# Patient Record
Sex: MALE | Race: WHITE | NOT HISPANIC OR LATINO
[De-identification: names, ages, dates, MRNs, and addresses within clinical notes are randomized per-mention and may not be internally consistent; named-entity substitution may affect disease eponyms.]

---

## 2018-12-03 ENCOUNTER — RX RENEWAL (OUTPATIENT)
Age: 68
End: 2018-12-03

## 2018-12-31 ENCOUNTER — RX RENEWAL (OUTPATIENT)
Age: 68
End: 2018-12-31

## 2019-01-25 ENCOUNTER — RX RENEWAL (OUTPATIENT)
Age: 69
End: 2019-01-25

## 2019-02-20 ENCOUNTER — RX RENEWAL (OUTPATIENT)
Age: 69
End: 2019-02-20

## 2019-03-18 ENCOUNTER — RX RENEWAL (OUTPATIENT)
Age: 69
End: 2019-03-18

## 2019-07-02 ENCOUNTER — RX RENEWAL (OUTPATIENT)
Age: 69
End: 2019-07-02

## 2019-08-04 ENCOUNTER — RX RENEWAL (OUTPATIENT)
Age: 69
End: 2019-08-04

## 2019-09-03 ENCOUNTER — RX RENEWAL (OUTPATIENT)
Age: 69
End: 2019-09-03

## 2019-09-12 ENCOUNTER — RX RENEWAL (OUTPATIENT)
Age: 69
End: 2019-09-12

## 2019-12-10 ENCOUNTER — RX RENEWAL (OUTPATIENT)
Age: 69
End: 2019-12-10

## 2020-03-15 ENCOUNTER — RX RENEWAL (OUTPATIENT)
Age: 70
End: 2020-03-15

## 2020-04-10 ENCOUNTER — RX RENEWAL (OUTPATIENT)
Age: 70
End: 2020-04-10

## 2020-08-19 ENCOUNTER — RESULT REVIEW (OUTPATIENT)
Age: 70
End: 2020-08-19

## 2020-08-19 ENCOUNTER — APPOINTMENT (OUTPATIENT)
Dept: GERIATRICS | Facility: CLINIC | Age: 70
End: 2020-08-19
Payer: MEDICARE

## 2020-08-19 VITALS
HEIGHT: 69 IN | WEIGHT: 159.13 LBS | HEART RATE: 64 BPM | SYSTOLIC BLOOD PRESSURE: 122 MMHG | BODY MASS INDEX: 23.57 KG/M2 | DIASTOLIC BLOOD PRESSURE: 78 MMHG | TEMPERATURE: 97.5 F | OXYGEN SATURATION: 98 % | RESPIRATION RATE: 16 BRPM

## 2020-08-19 DIAGNOSIS — Z81.8 FAMILY HISTORY OF OTHER MENTAL AND BEHAVIORAL DISORDERS: ICD-10-CM

## 2020-08-19 DIAGNOSIS — N39.0 URINARY TRACT INFECTION, SITE NOT SPECIFIED: ICD-10-CM

## 2020-08-19 DIAGNOSIS — Z82.49 FAMILY HISTORY OF ISCHEMIC HEART DISEASE AND OTHER DISEASES OF THE CIRCULATORY SYSTEM: ICD-10-CM

## 2020-08-19 DIAGNOSIS — J30.2 OTHER SEASONAL ALLERGIC RHINITIS: ICD-10-CM

## 2020-08-19 DIAGNOSIS — Z87.19 PERSONAL HISTORY OF OTHER DISEASES OF THE DIGESTIVE SYSTEM: ICD-10-CM

## 2020-08-19 DIAGNOSIS — F10.21 ALCOHOL DEPENDENCE, IN REMISSION: ICD-10-CM

## 2020-08-19 DIAGNOSIS — Z87.438 PERSONAL HISTORY OF OTHER DISEASES OF MALE GENITAL ORGANS: ICD-10-CM

## 2020-08-19 PROCEDURE — 99205 OFFICE O/P NEW HI 60 MIN: CPT

## 2020-08-19 RX ORDER — FERROUS SULFATE 325(65) MG
325 (65 FE) TABLET ORAL DAILY
Refills: 0 | Status: ACTIVE | COMMUNITY

## 2020-08-19 RX ORDER — ALBUTEROL SULFATE 90 UG/1
108 (90 BASE) AEROSOL, METERED RESPIRATORY (INHALATION)
Refills: 0 | Status: ACTIVE | COMMUNITY

## 2020-08-19 NOTE — HISTORY OF PRESENT ILLNESS
[Over the Past 2 Weeks, Have You Felt Down, Depressed, or Hopeless?] : 1.) Over the past 2 weeks, have you felt down, depressed, or hopeless? No [Retired] : retired from work [Spouse] : spouse [Over the Past 2 Weeks, Have You Felt Little Interest or Pleasure Doing Things?] : 2.) Over the past 2 weeks, have you felt little interest or pleasure doing things? No [Patient Concern] : no personal concern about alcohol use [Tolerance to Alcohol] : no tolerance to alcohol [Family Concern] : no family concern about alcohol use [Attempts to Cut Down] : no attempts to cut alcohol use [Annoyed by Criticism] : no annoyance by criticism of alcohol use [Never] : has never used illicit drugs [None] : The patient has no concerns about alcohol abuse [Morning Drinking] : no morning drinking [Adequate] : inadequate [Drives without concerns] : drives without concerns [Fully Independent] : fully independent [No history of falls] : no history of falls [Bicycle Helmet] : not using bicycle helmet [Seatbelts] : seatbelts [Motorcycle Helmet] : not using motorcycle helmet [Safe Driving Habits] : safe driving habits [Smoke Detectors] : smoke detectors [Carbon Monoxide Detector] : carbon monoxide detector [Fall Prevention Measures] : fall prevention measures [Hot Water Temp <120F] : hot water temp <120F [Bathroom Grab Bars] : not using bathroom grab bars [Sunscreen] : sunscreen [de-identified] : 0 [FreeTextEntry2] : No exposure [de-identified] : 0 [de-identified] : P/t does not smoke [de-identified] : P/t does not consume alcohol [de-identified] : P/t does not consume alcohol [de-identified] : None [de-identified] : P/t does not take illicit drugs [de-identified] : P/t does not take illicit drugs [0] : 1) Little interest or pleasure doing things: Not at all [PHQ-2 Score ___] : PHQ-2 Score [unfilled] [FreeTextEntry1] : controlled on wellbutryn

## 2020-08-19 NOTE — SOCIAL HISTORY
[No falls in past year] : Patient reported no falls in the past year [Fully functional (bathing, dressing, toileting, transferring, walking, feeding)] : Fully functional (bathing, dressing, toileting, transferring, walking, feeding) [Smoke Detector] : smoke detector [Fully functional (using the telephone, shopping, preparing meals, housekeeping, doing laundry, using transportation,] : Fully functional and needs no help or supervision to perform IADLs (using the telephone, shopping, preparing meals, housekeeping, doing laundry, using transportation, managing medications and managing finances) [Seat Belt] :  uses seat belt

## 2020-08-19 NOTE — REASON FOR VISIT
[Initial Annual Medicare Wellness Visit] : an initial annual Medicare wellness visit [Other: _____] : [unfilled] [FreeTextEntry1] : Annual/Physical [Initial Evaluation] : an initial evaluation

## 2020-08-19 NOTE — REVIEW OF SYSTEMS
[Feeling Poorly] : feeling poorly [Feeling Tired] : feeling tired [Dysuria] : dysuria [Incontinence] : incontinence [Hesitancy] : urinary hesitancy [Fever] : no fever [Eye Pain] : no eye pain [Chills] : no chills [Nosebleeds] : no nosebleeds [Red Eyes] : eyes not red [Chest Pain] : no chest pain [Nasal Discharge] : no nasal discharge [Palpitations] : no palpitations [Cough] : no cough [Constipation] : no constipation [SOB on Exertion] : no shortness of breath during exertion [Diarrhea] : no diarrhea [Joint Pain] : no joint pain [Arthralgias] : no arthralgias [Dizziness] : no dizziness [Skin Lesions] : no skin lesions [Anxiety] : no anxiety [de-identified] : controlled depression [Fainting] : no fainting

## 2020-08-19 NOTE — ASSESSMENT
[FreeTextEntry1] : rule out UTI vs pyelo vs prostatitis which he has had in past (early 20's)\par of note symptoms have returned s/p completion of levaquin\par will refer to [facility] to establish care\par check UA Ux to assure no current infection and also renal US\par if all negative could consider longer treatment for suspected prostatitis\par signed release to get records from PCP and urologist\par urology - issues with retention, issues passing catheter ?stricture?\par to get records\par physical, labs, MOLST in spring 2021\par also to discuss pneumonia shot\par will defer today as he may require antibiotics in 24 hours\par flu shot advised in fall\par has seen Dr. Sanchez in past for IBS - on bentyl and apparnetly due to repeat colonoscopy will print note from [facility]\par also very open\par admitted he has had issues with ETOH dependence and fiorcet/valium in past - clean for 5 years!

## 2020-08-19 NOTE — PHYSICAL EXAM
[General Appearance - Alert] : alert [General Appearance - In No Acute Distress] : in no acute distress [Sclera] : the sclera and conjunctiva were normal [General Appearance - Well Nourished] : well nourished [General Appearance - Well Developed] : well developed [PERRL With Normal Accommodation] : pupils were equal in size, round, and reactive to light [Extraocular Movements] : extraocular movements were intact [Normal Oral Mucosa] : normal oral mucosa [No Oral Pallor] : no oral pallor [Outer Ear] : the ears and nose were normal in appearance [Hearing Threshold Finger Rub Not [place]e] : hearing was normal [Neck Appearance] : the appearance of the neck was normal [Neck Cervical Mass (___cm)] : no neck mass was observed [Exaggerated Use Of Accessory Muscles For Inspiration] : no accessory muscle use [Respiration, Rhythm And Depth] : normal respiratory rhythm and effort [Auscultation Breath Sounds / Voice Sounds] : lungs were clear to auscultation bilaterally [Apical Impulse] : the apical impulse was normal [Heart Sounds] : normal S1 and S2 [Heart Sounds Gallop] : no gallops [Heart Sounds Pericardial Friction Rub] : no pericardial rub [Bowel Sounds] : normal bowel sounds [Abdomen Soft] : soft [Abdomen Tenderness] : non-tender [Cervical Lymph Nodes Enlarged Posterior Bilaterally] : posterior cervical [Cervical Lymph Nodes Enlarged Anterior Bilaterally] : anterior cervical [No CVA Tenderness] : no ~M costovertebral angle tenderness [Abnormal Walk] : normal gait [Nail Clubbing] : no clubbing  or cyanosis of the fingernails [Musculoskeletal - Swelling] : no joint swelling seen [Motor Tone] : muscle strength and tone were normal [Skin Color & Pigmentation] : normal skin color and pigmentation [] : no rash [Oriented To Time, Place, And Person] : oriented to person, place, and time [Skin Turgor] : normal skin turgor [Impaired Insight] : insight and judgment were intact [Penis Abnormality] : the penis was normal [Testes Swelling] : the testicles were not swollen [Scrotum] : the scrotum was normal [Testes Mass (___cm)] : there were no testicular masses

## 2020-08-19 NOTE — PHYSICAL EXAM
[General Appearance - Alert] : alert [General Appearance - In No Acute Distress] : in no acute distress [Sclera] : the sclera and conjunctiva were normal [General Appearance - Well Developed] : well developed [General Appearance - Well Nourished] : well nourished [Extraocular Movements] : extraocular movements were intact [Normal Oral Mucosa] : normal oral mucosa [PERRL With Normal Accommodation] : pupils were equal in size, round, and reactive to light [Outer Ear] : the ears and nose were normal in appearance [No Oral Pallor] : no oral pallor [Neck Appearance] : the appearance of the neck was normal [Hearing Threshold Finger Rub Not Jewell] : hearing was normal [Neck Cervical Mass (___cm)] : no neck mass was observed [Exaggerated Use Of Accessory Muscles For Inspiration] : no accessory muscle use [Respiration, Rhythm And Depth] : normal respiratory rhythm and effort [Apical Impulse] : the apical impulse was normal [Auscultation Breath Sounds / Voice Sounds] : lungs were clear to auscultation bilaterally [Heart Sounds] : normal S1 and S2 [Heart Sounds Gallop] : no gallops [Heart Sounds Pericardial Friction Rub] : no pericardial rub [Bowel Sounds] : normal bowel sounds [Abdomen Soft] : soft [Abdomen Tenderness] : non-tender [Cervical Lymph Nodes Enlarged Posterior Bilaterally] : posterior cervical [Cervical Lymph Nodes Enlarged Anterior Bilaterally] : anterior cervical [No CVA Tenderness] : no ~M costovertebral angle tenderness [Abnormal Walk] : normal gait [Nail Clubbing] : no clubbing  or cyanosis of the fingernails [Musculoskeletal - Swelling] : no joint swelling seen [Motor Tone] : muscle strength and tone were normal [Skin Color & Pigmentation] : normal skin color and pigmentation [] : no rash [Oriented To Time, Place, And Person] : oriented to person, place, and time [Skin Turgor] : normal skin turgor [Impaired Insight] : insight and judgment were intact [Penis Abnormality] : the penis was normal [Scrotum] : the scrotum was normal [Testes Swelling] : the testicles were not swollen [Testes Mass (___cm)] : there were no testicular masses

## 2020-08-19 NOTE — REVIEW OF SYSTEMS
[Feeling Poorly] : feeling poorly [Feeling Tired] : feeling tired [Dysuria] : dysuria [Incontinence] : incontinence [Hesitancy] : urinary hesitancy [Fever] : no fever [Eye Pain] : no eye pain [Chills] : no chills [Nosebleeds] : no nosebleeds [Red Eyes] : eyes not red [Nasal Discharge] : no nasal discharge [Chest Pain] : no chest pain [Cough] : no cough [Palpitations] : no palpitations [SOB on Exertion] : no shortness of breath during exertion [Constipation] : no constipation [Arthralgias] : no arthralgias [Joint Pain] : no joint pain [Diarrhea] : no diarrhea [Dizziness] : no dizziness [Skin Lesions] : no skin lesions [de-identified] : controlled depression [Fainting] : no fainting [Anxiety] : no anxiety

## 2020-08-19 NOTE — HISTORY OF PRESENT ILLNESS
[Over the Past 2 Weeks, Have You Felt Down, Depressed, or Hopeless?] : 1.) Over the past 2 weeks, have you felt down, depressed, or hopeless? No [Over the Past 2 Weeks, Have You Felt Little Interest or Pleasure Doing Things?] : 2.) Over the past 2 weeks, have you felt little interest or pleasure doing things? No [Retired] : retired from work [Spouse] : spouse [Tolerance to Alcohol] : no tolerance to alcohol [Patient Concern] : no personal concern about alcohol use [Family Concern] : no family concern about alcohol use [Attempts to Cut Down] : no attempts to cut alcohol use [Annoyed by Criticism] : no annoyance by criticism of alcohol use [Morning Drinking] : no morning drinking [Never] : has never used illicit drugs [None] : The patient has no concerns about alcohol abuse [Fully Independent] : fully independent [Adequate] : inadequate [Drives without concerns] : drives without concerns [No history of falls] : no history of falls [Seatbelts] : seatbelts [Bicycle Helmet] : not using bicycle helmet [Motorcycle Helmet] : not using motorcycle helmet [Safe Driving Habits] : safe driving habits [Smoke Detectors] : smoke detectors [Carbon Monoxide Detector] : carbon monoxide detector [Bathroom Grab Bars] : not using bathroom grab bars [Hot Water Temp <120F] : hot water temp <120F [Fall Prevention Measures] : fall prevention measures [de-identified] : 0 [Sunscreen] : sunscreen [de-identified] : P/t does not smoke [de-identified] : 0 [FreeTextEntry2] : No exposure [de-identified] : P/t does not consume alcohol [de-identified] : P/t does not consume alcohol [de-identified] : P/t does not take illicit drugs [de-identified] : None [de-identified] : P/t does not take illicit drugs [0] : 2) Feeling down, depressed, or hopeless: Not at all [PHQ-2 Score ___] : PHQ-2 Score [unfilled] [FreeTextEntry1] : controlled on wellbutryn

## 2020-08-19 NOTE — ASSESSMENT
[FreeTextEntry1] : rule out UTI vs pyelo vs prostatitis which he has had in past (early 20's)\par of note symptoms have returned s/p completion of levaquin\par will refer to [facility] to establish care\par check UA Ux to assure no current infection and also renal US\par if all negative could consider longer treatment for suspected prostatitis\par signed release to get records from PCP and urologist\par urology - issues with retention, issues passing catheter ?stricture?\par to get records\par physical, labs, MOLST in spring 2021\par also to discuss pneumonia shot\par will defer today as he may require antibiotics in 24 hours\par flu shot advised in fall\par has seen Dr. Sanchez in past for IBS - on bentyl and apparnetly due to repeat colonoscopy will print note from [facility]\par also very open\par admitted he has had issues with ETOH dependence and fiorcet/valium in past - clean for 5 years!

## 2020-08-21 ENCOUNTER — RESULT REVIEW (OUTPATIENT)
Age: 70
End: 2020-08-21

## 2020-09-22 DIAGNOSIS — Z23 ENCOUNTER FOR IMMUNIZATION: ICD-10-CM

## 2020-10-09 ENCOUNTER — APPOINTMENT (OUTPATIENT)
Dept: GERIATRICS | Facility: CLINIC | Age: 70
End: 2020-10-09
Payer: MEDICARE

## 2020-10-09 PROCEDURE — ZZZZZ: CPT

## 2020-11-09 ENCOUNTER — APPOINTMENT (OUTPATIENT)
Dept: GERIATRICS | Facility: CLINIC | Age: 70
End: 2020-11-09
Payer: MEDICARE

## 2020-11-09 VITALS
BODY MASS INDEX: 23.42 KG/M2 | SYSTOLIC BLOOD PRESSURE: 120 MMHG | HEART RATE: 64 BPM | OXYGEN SATURATION: 94 % | WEIGHT: 158.6 LBS | TEMPERATURE: 97.8 F | DIASTOLIC BLOOD PRESSURE: 80 MMHG

## 2020-11-09 DIAGNOSIS — Z87.09 PERSONAL HISTORY OF OTHER DISEASES OF THE RESPIRATORY SYSTEM: ICD-10-CM

## 2020-11-09 PROCEDURE — 99214 OFFICE O/P EST MOD 30 MIN: CPT

## 2020-11-09 RX ORDER — LEVOFLOXACIN 500 MG/1
500 TABLET, FILM COATED ORAL DAILY
Qty: 14 | Refills: 0 | Status: COMPLETED | COMMUNITY
Start: 2020-08-21 | End: 2020-11-09

## 2020-11-09 RX ORDER — BACLOFEN 10 MG/1
10 TABLET ORAL
Qty: 60 | Refills: 0 | Status: COMPLETED | COMMUNITY
Start: 2020-09-29

## 2020-11-09 RX ORDER — SULFAMETHOXAZOLE AND TRIMETHOPRIM 400; 80 MG/1; MG/1
400-80 TABLET ORAL
Qty: 16 | Refills: 0 | Status: COMPLETED | COMMUNITY
Start: 2020-08-06

## 2020-11-13 NOTE — REVIEW OF SYSTEMS
[Anxiety] : anxiety [Depression] : depression [Negative] : Heme/Lymph [Suicidal] : not suicidal [FreeTextEntry8] : BPH

## 2020-11-13 NOTE — PHYSICAL EXAM
[No Acute Distress] : no acute distress [Well-Appearing] : well-appearing [Normal Sclera/Conjunctiva] : normal sclera/conjunctiva [PERRL] : pupils equal round and reactive to light [EOMI] : extraocular movements intact [Normal Outer Ear/Nose] : the outer ears and nose were normal in appearance [Normal TMs] : both tympanic membranes were normal [No JVD] : no jugular venous distention [No Lymphadenopathy] : no lymphadenopathy [Supple] : supple [Thyroid Normal, No Nodules] : the thyroid was normal and there were no nodules present [No Respiratory Distress] : no respiratory distress  [No Accessory Muscle Use] : no accessory muscle use [Clear to Auscultation] : lungs were clear to auscultation bilaterally [Normal Rate] : normal rate  [Regular Rhythm] : with a regular rhythm [Normal S1, S2] : normal S1 and S2 [No Murmur] : no murmur heard [No Carotid Bruits] : no carotid bruits [No Abdominal Bruit] : a ~M bruit was not heard ~T in the abdomen [Pedal Pulses Present] : the pedal pulses are present [No Edema] : there was no peripheral edema [Soft] : abdomen soft [Non Tender] : non-tender [Non-distended] : non-distended [Normal Bowel Sounds] : normal bowel sounds [Normal Posterior Cervical Nodes] : no posterior cervical lymphadenopathy [Normal Anterior Cervical Nodes] : no anterior cervical lymphadenopathy [No CVA Tenderness] : no CVA  tenderness [No Spinal Tenderness] : no spinal tenderness [No Joint Swelling] : no joint swelling [Grossly Normal Strength/Tone] : grossly normal strength/tone [No Rash] : no rash [Coordination Grossly Intact] : coordination grossly intact [No Focal Deficits] : no focal deficits [Normal Gait] : normal gait [Normal Affect] : the affect was normal [Alert and Oriented x3] : oriented to person, place, and time [Normal Insight/Judgement] : insight and judgment were intact [de-identified] : Erythematous oropharynx

## 2020-11-13 NOTE — RESULTS/DATA
[] : results reviewed [de-identified] : Hgb 13.9 / Hct 41.3 [de-identified] : APTT 24.7 / PT 10.3 / INR 1.0 [de-identified] : Cr 0.95 ; glucose 86 [de-identified] : 1/13/20 Sinus anjali

## 2020-11-13 NOTE — HISTORY OF PRESENT ILLNESS
[Asthma] : asthma [(Patient denies any chest pain, claudication, dyspnea on exertion, orthopnea, palpitations or syncope)] : Patient denies any chest pain, claudication, dyspnea on exertion, orthopnea, palpitations or syncope [Good (7-10 METs)] : Good (7-10 METs) [Aortic Stenosis] : no aortic stenosis [Atrial Fibrillation] : no atrial fibrillation [Coronary Artery Disease] : no coronary artery disease [Recent Myocardial Infarction] : no recent myocardial infarction [Implantable Device/Pacemaker] : no implantable device/pacemaker [COPD] : no COPD [Sleep Apnea] : no sleep apnea [Smoker] : not a smoker [Family Member] : no family member with adverse anesthesia reaction/sudden death [Self] : no previous adverse anesthesia reaction [Chronic Anticoagulation] : no chronic anticoagulation [Chronic Kidney Disease] : no chronic kidney disease [Diabetes] : no diabetes [FreeTextEntry1] : TURP / TRUS [FreeTextEntry2] : 11/16/2020 [FreeTextEntry3] : Dr. Charles [FreeTextEntry4] : 69 year old male with a history of depression, HLD, GERD, IBS, asthma, BPH who presents today for a preoperative examination.   \par \par BPH: Patient had 2 TURPs, last one earlier this year.  Post op complication with dysuria, urinary retention, requiring catheterization and UTI.   On 11/3 increased issues with urination, contacted Dr. Reno's office and was seen.  A catheter was placed. Patient was referred to [facility].  He is now scheduled for a TURP / TRUS for contracture of bladder neck. Currently on Keflex.  Scheduled for preop at [facility] on 11/11/20.  \par \par Asthma: Last exacerbation about 3-4 weeks ago, used albuterol.  Never hospitalized for asthma.  Currently on Flovent.  Denies any chest tightness, wheezing, cough.  Walks daily, was also going on treadmill but has not been able to since catheterization.   [FreeTextEntry7] : EKG 1/13/2020 Sinus anjali

## 2020-11-13 NOTE — ASSESSMENT
[High Risk Surgery - Intraperitoneal, Intrathoracic or Supringuinal Vascular Procedures] : High Risk Surgery - Intraperitoneal, Intrathoracic or Supringuinal Vascular Procedures - No (0) [Ischemic Heart Disease] : Ischemic Heart Disease - No (0) [Congestive Heart Failure] : Congestive Heart Failure - No (0) [Prior Cerebrovascular Accident or TIA] : Prior Cerebrovascular Accident or TIA - No (0) [Creatinine >= 2mg/dL (1 Point)] : Creatinine >= 2mg/dL - No (0) [Insulin-dependent Diabetic (1 Point)] : Insulin-dependent Diabetic - No (0) [0] : 0 , RCRI Class: I, Risk of Post-Op Cardiac Complications: 3.9%, 95% CI for Risk Estimate: 2.8% - 5.4% [Continue medications as is] : Continue current medications [As per surgery] : as per surgery [Patient Optimized for Surgery] : Patient optimized for surgery [No Further Testing Recommended] : no further testing recommended [FreeTextEntry4] : Patient with no active cardiac or pulmonary symptoms.  He does have a history of asthma but it is well controlled on Flovent.  EKG from earlier this year was reviewed, sinus anjali noted.  Labs recently done at [facility] reviewed and stable.  Patient may proceed with the scheduled procedure with acceptable cardiac risk and without further cardiac work up or intervention.\par

## 2020-12-14 ENCOUNTER — APPOINTMENT (OUTPATIENT)
Dept: GASTROENTEROLOGY | Facility: CLINIC | Age: 70
End: 2020-12-14
Payer: MEDICARE

## 2020-12-14 VITALS
OXYGEN SATURATION: 97 % | HEART RATE: 69 BPM | HEIGHT: 69 IN | TEMPERATURE: 97.3 F | DIASTOLIC BLOOD PRESSURE: 80 MMHG | WEIGHT: 158 LBS | SYSTOLIC BLOOD PRESSURE: 110 MMHG | BODY MASS INDEX: 23.4 KG/M2

## 2020-12-14 DIAGNOSIS — Z12.11 ENCOUNTER FOR SCREENING FOR MALIGNANT NEOPLASM OF COLON: ICD-10-CM

## 2020-12-14 PROCEDURE — 99214 OFFICE O/P EST MOD 30 MIN: CPT

## 2020-12-14 RX ORDER — CEPHALEXIN 500 MG/1
500 CAPSULE ORAL
Refills: 0 | Status: DISCONTINUED | COMMUNITY
Start: 2020-11-09 | End: 2020-12-14

## 2020-12-14 RX ORDER — OMEPRAZOLE 20 MG/1
20 TABLET, DELAYED RELEASE ORAL DAILY
Refills: 0 | Status: DISCONTINUED | COMMUNITY
End: 2020-12-14

## 2020-12-14 NOTE — ASSESSMENT
[FreeTextEntry1] : Will plan on a colonoscopy for surveillance h/o polyps.  Explained risks/benefits/alternatives including not limited to bleeding, infection, perforation, missed lesions, anesthesia complications.  Patient understands and agrees, all questions answered.  Will use a split dose miralax/gatorade prep with clears the day prior.\par

## 2020-12-14 NOTE — HISTORY OF PRESENT ILLNESS
[FreeTextEntry1] : Mr. Herrera is a pleasant 70M h/o BPH, asthma, HLD, depression who returns for f/u.\par \par He was last seen 6/17 at which time he had an EGD/colonoscopy for GERD/screening:\par Impression:\par 1. Gastritis\par 2. Candidal esophagitis\par 3. 14 mm proximal transverse colon polyp\par 4. Internal hemorrhoids\par \par Unremarkable EGD pathology.\par \par His transverse colon polyp pathology came back as a tubular adenoma.  He was recommended for a 3 year interval colonoscopy.\par \par Feeling well.\par \par No complaints.  Normal brown BM daily.\par \par No heartburn or regurgitation currently.\par \par Does not smoke or drink currently.\par \par No family h/o any GI malignancies.

## 2020-12-14 NOTE — PHYSICAL EXAM
[General Appearance - Alert] : alert [General Appearance - In No Acute Distress] : in no acute distress [Sclera] : the sclera and conjunctiva were normal [Outer Ear] : the ears and nose were normal in appearance [Neck Appearance] : the appearance of the neck was normal [] : no respiratory distress [Abdomen Soft] : soft [Abdomen Tenderness] : non-tender [Abnormal Walk] : normal gait [Skin Color & Pigmentation] : normal skin color and pigmentation [Cranial Nerves] : cranial nerves 2-12 were intact [Oriented To Time, Place, And Person] : oriented to person, place, and time

## 2020-12-23 PROBLEM — N39.0 ACUTE LOWER UTI: Status: RESOLVED | Noted: 2020-08-19 | Resolved: 2020-12-23

## 2021-01-16 ENCOUNTER — RESULT REVIEW (OUTPATIENT)
Age: 71
End: 2021-01-16

## 2021-01-19 ENCOUNTER — APPOINTMENT (OUTPATIENT)
Dept: GASTROENTEROLOGY | Facility: HOSPITAL | Age: 71
End: 2021-01-19

## 2021-02-18 ENCOUNTER — RX RENEWAL (OUTPATIENT)
Age: 71
End: 2021-02-18

## 2021-04-13 ENCOUNTER — APPOINTMENT (OUTPATIENT)
Dept: GERIATRICS | Facility: CLINIC | Age: 71
End: 2021-04-13
Payer: MEDICARE

## 2021-04-13 VITALS
OXYGEN SATURATION: 97 % | SYSTOLIC BLOOD PRESSURE: 140 MMHG | DIASTOLIC BLOOD PRESSURE: 90 MMHG | WEIGHT: 163 LBS | TEMPERATURE: 97.9 F | HEART RATE: 77 BPM | BODY MASS INDEX: 24.07 KG/M2

## 2021-04-13 PROCEDURE — G0439: CPT

## 2021-04-13 NOTE — ASSESSMENT
[FreeTextEntry1] : will check fasting labs including iron studies, lipids and PSA\par he will have prostate exam with urologist\par will get urology progress note, US and cystoscopy results faxed to our office\par refer to [facility] for allergic rhinitis/asthma workup\par plan is to RTC 3 months for MOLST\par in future consider MOCA for baseline\par and Tdap and Shingrix\par \par

## 2021-04-13 NOTE — REVIEW OF SYSTEMS
[Fever] : no fever [Chills] : no chills [Feeling Poorly] : feeling poorly [Feeling Tired] : feeling tired [Eye Pain] : no eye pain [Red Eyes] : eyes not red [Nosebleeds] : no nosebleeds [Nasal Discharge] : no nasal discharge [Chest Pain] : no chest pain [Palpitations] : no palpitations [Cough] : no cough [SOB on Exertion] : no shortness of breath during exertion [Constipation] : no constipation [Diarrhea] : no diarrhea [Dysuria] : dysuria [Incontinence] : incontinence [Hesitancy] : urinary hesitancy [Arthralgias] : no arthralgias [Joint Pain] : no joint pain [Skin Lesions] : no skin lesions [Dizziness] : no dizziness [Fainting] : no fainting [Anxiety] : no anxiety [de-identified] : controlled depression

## 2021-04-13 NOTE — HISTORY OF PRESENT ILLNESS
[0] : 0 [Compliant with medications] : compliant with medications [Drives without concerns] : drives without concerns [No history of falls] : no history of falls [Seatbelts] : seatbelts [Safe Driving Habits] : safe driving habits [Smoke Detectors] : smoke detectors [Carbon Monoxide Detector] : carbon monoxide detector [PMH Reviewed and Updated] : past medical history reviewed and updated [PSH Reviewed and Updated] : past surgical history reviewed and updated [Family History Reviewed and Updated] : family history reviewed and updated [Medication and Allergies Reconciled] : medication and allergies reconciled [Spouse] : spouse [Retired] : retired from work [None] : The patient has no concerns about alcohol abuse [Never] : has never used illicit drugs [Over the Past 2 Weeks, Have You Felt Down, Depressed, or Hopeless?] : 1.) Over the past 2 weeks, have you felt down, depressed, or hopeless? No [Over the Past 2 Weeks, Have You Felt Little Interest or Pleasure Doing Things?] : 2.) Over the past 2 weeks, have you felt little interest or pleasure doing things? No [FreeTextEntry1] : [facility] doctors in [place] for several years\par Allergist Lois Mendelson \par wants to switch allergist/pulmonary to [facility]\par wants to make sure treatment plan is still acceptable as his asthma seems worse at times\par Now trying to establish care at [facility]\par \par Hx BPH\par recent TURP\par Dr Charles is urologist \par urologist is concerned about retention, has recommended timed toileting \par due for bladder US still to schedule\par did a cystoscopy last visit \par \par COVID short March 4/21 pfizer \par \par \par \par \par

## 2021-04-13 NOTE — PHYSICAL EXAM
[General Appearance - Alert] : alert [General Appearance - In No Acute Distress] : in no acute distress [General Appearance - Well Nourished] : well nourished [General Appearance - Well Developed] : well developed [Sclera] : the sclera and conjunctiva were normal [PERRL With Normal Accommodation] : pupils were equal in size, round, and reactive to light [Extraocular Movements] : extraocular movements were intact [Normal Oral Mucosa] : normal oral mucosa [No Oral Pallor] : no oral pallor [Outer Ear] : the ears and nose were normal in appearance [Hearing Threshold Finger Rub Not [place]] : hearing was normal [Neck Appearance] : the appearance of the neck was normal [Neck Cervical Mass (___cm)] : no neck mass was observed [Respiration, Rhythm And Depth] : normal respiratory rhythm and effort [Exaggerated Use Of Accessory Muscles For Inspiration] : no accessory muscle use [Auscultation Breath Sounds / Voice Sounds] : lungs were clear to auscultation bilaterally [Apical Impulse] : the apical impulse was normal [Heart Sounds] : normal S1 and S2 [Heart Sounds Gallop] : no gallops [Heart Sounds Pericardial Friction Rub] : no pericardial rub [Bowel Sounds] : normal bowel sounds [Abdomen Soft] : soft [Abdomen Tenderness] : non-tender [Penis Abnormality] : the penis was normal [Scrotum] : the scrotum was normal [Testes Swelling] : the testicles were not swollen [Testes Mass (___cm)] : there were no testicular masses [Cervical Lymph Nodes Enlarged Posterior Bilaterally] : posterior cervical [Cervical Lymph Nodes Enlarged Anterior Bilaterally] : anterior cervical [No CVA Tenderness] : no ~M costovertebral angle tenderness [Abnormal Walk] : normal gait [Nail Clubbing] : no clubbing  or cyanosis of the fingernails [Musculoskeletal - Swelling] : no joint swelling seen [Motor Tone] : muscle strength and tone were normal [Skin Color & Pigmentation] : normal skin color and pigmentation [Skin Turgor] : normal skin turgor [] : no rash [Oriented To Time, Place, And Person] : oriented to person, place, and time [Impaired Insight] : insight and judgment were intact

## 2021-04-14 ENCOUNTER — RESULT REVIEW (OUTPATIENT)
Age: 71
End: 2021-04-14

## 2021-05-25 ENCOUNTER — APPOINTMENT (OUTPATIENT)
Dept: GERIATRICS | Facility: CLINIC | Age: 71
End: 2021-05-25
Payer: MEDICARE

## 2021-05-25 VITALS
TEMPERATURE: 97 F | WEIGHT: 159 LBS | SYSTOLIC BLOOD PRESSURE: 104 MMHG | DIASTOLIC BLOOD PRESSURE: 66 MMHG | HEIGHT: 69 IN | OXYGEN SATURATION: 97 % | BODY MASS INDEX: 23.55 KG/M2 | HEART RATE: 66 BPM

## 2021-05-25 PROCEDURE — 99214 OFFICE O/P EST MOD 30 MIN: CPT

## 2021-05-25 RX ORDER — BUPROPION HYDROCHLORIDE 150 MG/1
150 TABLET, FILM COATED ORAL
Refills: 0 | Status: ACTIVE | COMMUNITY

## 2021-05-25 NOTE — ASSESSMENT
[FreeTextEntry1] : check labs and urine for reversible caues of fatigue\par also will add lymes as patient is gardening\par consider prozac as this is a new medicaiton and can have patient take this at bedtimeas an option for now lower dose to 5mg

## 2021-05-25 NOTE — PHYSICAL EXAM
[General Appearance - Alert] : alert [General Appearance - In No Acute Distress] : in no acute distress [General Appearance - Well Nourished] : well nourished [General Appearance - Well Developed] : well developed [Sclera] : the sclera and conjunctiva were normal [PERRL With Normal Accommodation] : pupils were equal in size, round, and reactive to light [Extraocular Movements] : extraocular movements were intact [Normal Oral Mucosa] : normal oral mucosa [No Oral Pallor] : no oral pallor [Outer Ear] : the ears and nose were normal in appearance [Hearing Threshold Finger Rub Not [place]] : hearing was normal [Neck Appearance] : the appearance of the neck was normal [Neck Cervical Mass (___cm)] : no neck mass was observed [Respiration, Rhythm And Depth] : normal respiratory rhythm and effort [Exaggerated Use Of Accessory Muscles For Inspiration] : no accessory muscle use [Auscultation Breath Sounds / Voice Sounds] : lungs were clear to auscultation bilaterally [Apical Impulse] : the apical impulse was normal [Heart Sounds] : normal S1 and S2 [Heart Sounds Gallop] : no gallops [Heart Sounds Pericardial Friction Rub] : no pericardial rub [Bowel Sounds] : normal bowel sounds [Abdomen Soft] : soft [Abdomen Tenderness] : non-tender [Abnormal Walk] : normal gait [Nail Clubbing] : no clubbing  or cyanosis of the fingernails [Musculoskeletal - Swelling] : no joint swelling seen [Motor Tone] : muscle strength and tone were normal [Skin Color & Pigmentation] : normal skin color and pigmentation [Skin Turgor] : normal skin turgor [] : no rash [Oriented To Time, Place, And Person] : oriented to person, place, and time [Impaired Insight] : insight and judgment were intact

## 2021-05-25 NOTE — REVIEW OF SYSTEMS
[Fever] : no fever [Chills] : no chills [Feeling Poorly] : feeling poorly [Feeling Tired] : feeling tired [Eye Pain] : no eye pain [Red Eyes] : eyes not red [Nosebleeds] : no nosebleeds [Nasal Discharge] : no nasal discharge [Chest Pain] : no chest pain [Palpitations] : no palpitations [Cough] : no cough [SOB on Exertion] : no shortness of breath during exertion [Constipation] : no constipation [Diarrhea] : no diarrhea [Dysuria] : dysuria [Incontinence] : incontinence [Hesitancy] : urinary hesitancy [Arthralgias] : no arthralgias [Joint Pain] : no joint pain [Skin Lesions] : no skin lesions [Dizziness] : no dizziness [Fainting] : no fainting [Anxiety] : no anxiety [de-identified] : controlled depression

## 2021-05-25 NOTE — HISTORY OF PRESENT ILLNESS
[No falls in past year] : Patient reported no falls in the past year [Fully functional (bathing, dressing, toileting, transferring, walking, feeding)] : Fully functional (bathing, dressing, toileting, transferring, walking, feeding) [Fully functional (using the telephone, shopping, preparing meals, housekeeping, doing laundry, using transportation,] : Fully functional and needs no help or supervision to perform IADLs (using the telephone, shopping, preparing meals, housekeeping, doing laundry, using transportation, managing medications and managing finances) [Smoke Detector] : smoke detector [Carbon Monoxide Detector] : carbon monoxide detector [Seat Belt] :  uses seat belt [Driving] : driving [0] : 2) Feeling down, depressed, or hopeless: Not at all [PHQ-2 Score ___] : PHQ-2 Score [unfilled] [FreeTextEntry1] : typically has always had similar pattern\par sleeping at around 11PM and up around 6AM\par has early afternoon nap\par \par now going to bed earlier\par feeling tired\par feels deep sleep during nap\par more gardening\par \par \par wife is Danita Shah

## 2021-05-26 LAB
ALBUMIN SERPL ELPH-MCNC: 4.6 G/DL
ALP BLD-CCNC: 53 U/L
ALT SERPL-CCNC: 19 U/L
ANION GAP SERPL CALC-SCNC: 12 MMOL/L
APPEARANCE: CLEAR
AST SERPL-CCNC: 23 U/L
BASOPHILS # BLD AUTO: 0.04 K/UL
BASOPHILS NFR BLD AUTO: 0.7 %
BILIRUB SERPL-MCNC: 0.4 MG/DL
BILIRUBIN URINE: NEGATIVE
BLOOD URINE: NEGATIVE
BUN SERPL-MCNC: 21 MG/DL
CALCIUM SERPL-MCNC: 9.2 MG/DL
CHLORIDE SERPL-SCNC: 106 MMOL/L
CO2 SERPL-SCNC: 22 MMOL/L
COLOR: NORMAL
CREAT SERPL-MCNC: 1.07 MG/DL
EOSINOPHIL # BLD AUTO: 0.09 K/UL
EOSINOPHIL NFR BLD AUTO: 1.6 %
GLUCOSE QUALITATIVE U: NEGATIVE
GLUCOSE SERPL-MCNC: 82 MG/DL
HCT VFR BLD CALC: 42.6 %
HGB BLD-MCNC: 14.6 G/DL
IMM GRANULOCYTES NFR BLD AUTO: 0 %
KETONES URINE: NEGATIVE
LEUKOCYTE ESTERASE URINE: NEGATIVE
LYMPHOCYTES # BLD AUTO: 1.33 K/UL
LYMPHOCYTES NFR BLD AUTO: 24.1 %
MAN DIFF?: NORMAL
MCHC RBC-ENTMCNC: 34.3 GM/DL
MCHC RBC-ENTMCNC: 34.8 PG
MCV RBC AUTO: 101.7 FL
MONOCYTES # BLD AUTO: 0.39 K/UL
MONOCYTES NFR BLD AUTO: 7.1 %
NEUTROPHILS # BLD AUTO: 3.68 K/UL
NEUTROPHILS NFR BLD AUTO: 66.5 %
NITRITE URINE: NEGATIVE
PH URINE: 6
PLATELET # BLD AUTO: 191 K/UL
POTASSIUM SERPL-SCNC: 4 MMOL/L
PROT SERPL-MCNC: 6.4 G/DL
PROTEIN URINE: NEGATIVE
RBC # BLD: 4.19 M/UL
RBC # FLD: 12.3 %
SODIUM SERPL-SCNC: 140 MMOL/L
SPECIFIC GRAVITY URINE: 1.02
TSH SERPL-ACNC: 1.07 UIU/ML
UROBILINOGEN URINE: NORMAL
WBC # FLD AUTO: 5.53 K/UL

## 2021-05-27 ENCOUNTER — APPOINTMENT (OUTPATIENT)
Dept: PULMONOLOGY | Facility: CLINIC | Age: 71
End: 2021-05-27
Payer: MEDICARE

## 2021-05-27 VITALS
TEMPERATURE: 96.6 F | HEIGHT: 69 IN | DIASTOLIC BLOOD PRESSURE: 60 MMHG | WEIGHT: 159 LBS | HEART RATE: 66 BPM | BODY MASS INDEX: 23.55 KG/M2 | OXYGEN SATURATION: 97 % | SYSTOLIC BLOOD PRESSURE: 108 MMHG

## 2021-05-27 LAB — EPWORTH SCORE: 9

## 2021-05-27 PROCEDURE — 99205 OFFICE O/P NEW HI 60 MIN: CPT

## 2021-05-27 NOTE — HISTORY OF PRESENT ILLNESS
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 His chest feels very tight during the winter like he cannot get a full breath. Since he moved in 2013 he has been having issues in the winter. He states now with his allergies he feels he cannot fully expand his chest and his asthma is worse in hot humid weather. He rarely coughs and denies any phlegm. He has not felt a difference while wearing a face mask. He does see his allergist 4x a year. He does have a Symbicort inhaler that he used this winter with some improvement. He is taking Singulair and feels it does help. He is able to walk 3.5 miles everyday on the treadmill and notes he does notice his breathing is more slightly more labored after 5-10 minutes. He c/o constant post nasal drip and has seen ENT in the past who recommended surgery but he declined because of potential risk pf loss of smell. He was allergy tested previously and was allergic to dust and mold. He used to get sinus infections once a year when he would travel for work and sometimes had sinus headaches on longer flights. He denies any ankle swelling or calf pain. Patient does snore slightly and states he uses Afrin to help with his snoring. In the past 4-5 months he has felt like he needed more sleep than usual. He goes to bed at 10-11pm and is struggling to get up at 6am where in the past he woke up fine. He would take a short nap after lunch previously but in the past couple months he falls into a deep sleep after lunch for about an hour. He wakes up 2-3x a night but goes back to sleep very quickly. His wife does have insomnia but he does not think she is waking him up during the night. Only about once a month is when he has difficulty going back to sleep upon awakening in the middle of the night. He denies xerostomia or memory recall difficulties. Since taking pantoprazole his heartburn has improved. He does use the [employer] each morning and states it comes out clear but occasionally there is a "darkish piece" 2-3x a week. He does not have any pets at home. He denies facial trauma.

## 2021-05-27 NOTE — CONSULT LETTER
[FreeTextEntry1] : Thank you for allowing me to consult on GENNA GOMES  for  asthma.  Please see my note below.\par   [FreeTextEntry3] : Thank you very much for allowing me to consult on your patient.  If you have any questions, please do not hesitate to contact me.\par \par Sincerely,\par \par Alejandro Garcia MD\par Pulmonary and Sleep Medicine\par [facility]\par  [___] : [unfilled]

## 2021-05-27 NOTE — PHYSICAL EXAM
[No Acute Distress] : no acute distress [Normal Appearance] : normal appearance [No Neck Mass] : no neck mass [Normal Rate/Rhythm] : normal rate/rhythm [Normal S1, S2] : normal s1, s2 [No Murmurs] : no murmurs [No Resp Distress] : no resp distress [Clear to Auscultation Bilaterally] : clear to auscultation bilaterally [No Abnormalities] : no abnormalities [Benign] : benign [Normal Gait] : normal gait [No Clubbing] : no clubbing [No Cyanosis] : no cyanosis [FROM] : FROM [No Edema] : no edema [Normal Color/ Pigmentation] : normal color/ pigmentation [No Focal Deficits] : no focal deficits [Oriented x3] : oriented x3 [Normal Affect] : normal affect [III] : Mallampati Class: III [TextBox_11] : deviated septum, L naris obstruction, erythematous oropharynx [TextBox_68] : lungs globally diminished BS

## 2021-05-27 NOTE — REVIEW OF SYSTEMS
[EDS] : eds [Nasal Congestion] : nasal congestion [Postnasal Drip] : postnasal drip [Chest Tightness] : chest tightness [Wheezing] : wheezing [A.M. Dry Mouth] : a.m. dry mouth [SOB on Exertion] : sob on exertion [Chest Discomfort] : chest discomfort [Seasonal Allergies] : seasonal allergies [GERD] : gerd [History of Iron Deficiency] : history of iron deficiency [Headache] : headache [Depression] : depression [Anxiety] : anxiety [Negative] : Endocrine

## 2021-05-27 NOTE — ASSESSMENT
[FreeTextEntry1] : The above was discussed at length with the patient who has an excellent understanding of the issues.

## 2021-05-28 LAB
BASOPHILS # BLD AUTO: 0.05 K/UL
BASOPHILS NFR BLD AUTO: 1 %
CRP SERPL-MCNC: <3 MG/L
EOSINOPHIL # BLD AUTO: 0.12 K/UL
EOSINOPHIL NFR BLD AUTO: 2.4 %
HCT VFR BLD CALC: 40.4 %
HGB BLD-MCNC: 13.5 G/DL
IMM GRANULOCYTES NFR BLD AUTO: 0 %
LYMPHOCYTES # BLD AUTO: 1.46 K/UL
LYMPHOCYTES NFR BLD AUTO: 29.1 %
MAN DIFF?: NORMAL
MCHC RBC-ENTMCNC: 33.4 GM/DL
MCHC RBC-ENTMCNC: 34 PG
MCV RBC AUTO: 101.8 FL
MONOCYTES # BLD AUTO: 0.44 K/UL
MONOCYTES NFR BLD AUTO: 8.8 %
NEUTROPHILS # BLD AUTO: 2.94 K/UL
NEUTROPHILS NFR BLD AUTO: 58.7 %
PLATELET # BLD AUTO: 190 K/UL
RBC # BLD: 3.97 M/UL
RBC # FLD: 12 %
WBC # FLD AUTO: 5.01 K/UL

## 2021-06-01 LAB
B BURGDOR AB SER-IMP: NEGATIVE
B BURGDOR IGM PATRN SER IB-IMP: NEGATIVE
B BURGDOR18KD IGG SER QL IB: NORMAL
B BURGDOR23KD IGG SER QL IB: NORMAL
B BURGDOR23KD IGM SER QL IB: NORMAL
B BURGDOR28KD IGG SER QL IB: NORMAL
B BURGDOR30KD IGG SER QL IB: NORMAL
B BURGDOR31KD IGG SER QL IB: PRESENT
B BURGDOR39KD IGG SER QL IB: NORMAL
B BURGDOR39KD IGM SER QL IB: NORMAL
B BURGDOR41KD IGG SER QL IB: PRESENT
B BURGDOR41KD IGM SER QL IB: NORMAL
B BURGDOR45KD IGG SER QL IB: NORMAL
B BURGDOR58KD IGG SER QL IB: PRESENT
B BURGDOR66KD IGG SER QL IB: NORMAL
B BURGDOR93KD IGG SER QL IB: NORMAL
TOTAL IGE SMQN RAST: 71 KU/L

## 2021-06-02 ENCOUNTER — APPOINTMENT (OUTPATIENT)
Dept: PULMONOLOGY | Facility: CLINIC | Age: 71
End: 2021-06-02

## 2021-06-19 ENCOUNTER — RESULT REVIEW (OUTPATIENT)
Age: 71
End: 2021-06-19

## 2021-06-30 ENCOUNTER — NON-APPOINTMENT (OUTPATIENT)
Age: 71
End: 2021-06-30

## 2021-07-07 ENCOUNTER — APPOINTMENT (OUTPATIENT)
Dept: PULMONOLOGY | Facility: CLINIC | Age: 71
End: 2021-07-07
Payer: MEDICARE

## 2021-07-07 VITALS
HEIGHT: 69 IN | BODY MASS INDEX: 23.55 KG/M2 | WEIGHT: 159 LBS | OXYGEN SATURATION: 96 % | HEART RATE: 74 BPM | DIASTOLIC BLOOD PRESSURE: 70 MMHG | SYSTOLIC BLOOD PRESSURE: 130 MMHG | TEMPERATURE: 96.8 F

## 2021-07-07 DIAGNOSIS — R06.83 SNORING: ICD-10-CM

## 2021-07-07 DIAGNOSIS — J30.89 OTHER ALLERGIC RHINITIS: ICD-10-CM

## 2021-07-07 PROCEDURE — 99215 OFFICE O/P EST HI 40 MIN: CPT

## 2021-07-07 NOTE — HISTORY OF PRESENT ILLNESS
[Never] : never [TextBox_4] : Patient is here on a f/u for allergic asthma. He feels "immensely" better on Breo. The heat from his radiators made the air in his house very dry and gave him trouble breathing in the winter, a problem that has gone away with the warm weather. However, hot/humid days are uncomfortable for him. He c/o nasal congestion, heaviness in his chest, and labored breathing, as well as slight hearing loss. ENT diagnosed him with nonallergic rhinitis. 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 He exercises on the treadmill daily and also jumps rope.

## 2021-07-07 NOTE — PHYSICAL EXAM
[No Acute Distress] : no acute distress [III] : Mallampati Class: III [Normal Appearance] : normal appearance [No Neck Mass] : no neck mass [Normal Rate/Rhythm] : normal rate/rhythm [Normal S1, S2] : normal s1, s2 [No Murmurs] : no murmurs [No Resp Distress] : no resp distress [Clear to Auscultation Bilaterally] : clear to auscultation bilaterally [No Abnormalities] : no abnormalities [Benign] : benign [Normal Gait] : normal gait [No Clubbing] : no clubbing [No Cyanosis] : no cyanosis [No Edema] : no edema [FROM] : FROM [Normal Color/ Pigmentation] : normal color/ pigmentation [No Focal Deficits] : no focal deficits [Oriented x3] : oriented x3 [Normal Affect] : normal affect [TextBox_11] : deviated septum, L naris obstruction

## 2021-07-07 NOTE — DISCUSSION/SUMMARY
[FreeTextEntry1] : PFTs 06/22/2021 Actual FEV1 2.89 (FEV1 Pred 91%), FEV1/FVC(%) - 77, %, TLC 99%, RV/%, DLCO 76% \par Flow volume loop is suggestive of airway obstruction. 6MWT was negative.\par \par HST 6/20/2021, AHI: 9, supine AHI: 33, lowest saturation 83%

## 2021-07-13 ENCOUNTER — APPOINTMENT (OUTPATIENT)
Dept: GERIATRICS | Facility: CLINIC | Age: 71
End: 2021-07-13
Payer: MEDICARE

## 2021-07-13 VITALS
BODY MASS INDEX: 23.92 KG/M2 | TEMPERATURE: 97.9 F | DIASTOLIC BLOOD PRESSURE: 80 MMHG | OXYGEN SATURATION: 100 % | SYSTOLIC BLOOD PRESSURE: 119 MMHG | WEIGHT: 162 LBS | HEART RATE: 68 BPM

## 2021-07-13 DIAGNOSIS — Z00.00 ENCOUNTER FOR GENERAL ADULT MEDICAL EXAMINATION W/OUT ABNORMAL FINDINGS: ICD-10-CM

## 2021-07-13 DIAGNOSIS — Z71.89 OTHER SPECIFIED COUNSELING: ICD-10-CM

## 2021-07-13 PROCEDURE — 99215 OFFICE O/P EST HI 40 MIN: CPT

## 2021-07-14 PROBLEM — Z71.89 GOALS OF CARE, COUNSELING/DISCUSSION: Status: ACTIVE | Noted: 2021-07-14

## 2021-07-14 NOTE — HISTORY OF PRESENT ILLNESS
[No falls in past year] : Patient reported no falls in the past year [Fully functional (bathing, dressing, toileting, transferring, walking, feeding)] : Fully functional (bathing, dressing, toileting, transferring, walking, feeding) [Fully functional (using the telephone, shopping, preparing meals, housekeeping, doing laundry, using transportation,] : Fully functional and needs no help or supervision to perform IADLs (using the telephone, shopping, preparing meals, housekeeping, doing laundry, using transportation, managing medications and managing finances) [Smoke Detector] : smoke detector [Carbon Monoxide Detector] : carbon monoxide detector [Seat Belt] :  uses seat belt [Driving] : driving [0] : 2) Feeling down, depressed, or hopeless: Not at all [PHQ-2 Score ___] : PHQ-2 Score [unfilled] [FreeTextEntry1] : applying to become a volunteer at [employer] needs labs and forms filled out\par \par here for MOLST \par has never discussed this with a doctor prior\par wife is Danita Shah

## 2021-07-14 NOTE — ASSESSMENT
[FreeTextEntry1] : filled out MOLST today\par will scan to chart\par \par will have labs done for volunteer form\par

## 2021-07-14 NOTE — PHYSICAL EXAM
[General Appearance - Alert] : alert [General Appearance - In No Acute Distress] : in no acute distress [General Appearance - Well Nourished] : well nourished [General Appearance - Well Developed] : well developed [Sclera] : the sclera and conjunctiva were normal [PERRL With Normal Accommodation] : pupils were equal in size, round, and reactive to light [Extraocular Movements] : extraocular movements were intact [Normal Oral Mucosa] : normal oral mucosa [No Oral Pallor] : no oral pallor [Outer Ear] : the ears and nose were normal in appearance [Hearing Threshold Finger Rub Not [place]] : hearing was normal [Neck Appearance] : the appearance of the neck was normal [Neck Cervical Mass (___cm)] : no neck mass was observed [Respiration, Rhythm And Depth] : normal respiratory rhythm and effort [Exaggerated Use Of Accessory Muscles For Inspiration] : no accessory muscle use [Auscultation Breath Sounds / Voice Sounds] : lungs were clear to auscultation bilaterally [Apical Impulse] : the apical impulse was normal [Heart Sounds] : normal S1 and S2 [Heart Sounds Gallop] : no gallops [Heart Sounds Pericardial Friction Rub] : no pericardial rub [Bowel Sounds] : normal bowel sounds [Abdomen Soft] : soft [Abdomen Tenderness] : non-tender [Abnormal Walk] : normal gait [Nail Clubbing] : no clubbing  or cyanosis of the fingernails [Musculoskeletal - Swelling] : no joint swelling seen [Motor Tone] : muscle strength and tone were normal [Skin Color & Pigmentation] : normal skin color and pigmentation [Skin Turgor] : normal skin turgor [] : no rash [Oriented To Time, Place, And Person] : oriented to person, place, and time [Impaired Insight] : insight and judgment were intact

## 2021-07-14 NOTE — REVIEW OF SYSTEMS
[Fever] : no fever [Chills] : no chills [Feeling Poorly] : feeling poorly [Feeling Tired] : feeling tired [Eye Pain] : no eye pain [Red Eyes] : eyes not red [Nosebleeds] : no nosebleeds [Nasal Discharge] : no nasal discharge [Chest Pain] : no chest pain [Palpitations] : no palpitations [Cough] : no cough [SOB on Exertion] : no shortness of breath during exertion [Constipation] : no constipation [Diarrhea] : no diarrhea [Dysuria] : dysuria [Incontinence] : incontinence [Hesitancy] : urinary hesitancy [Arthralgias] : no arthralgias [Joint Pain] : no joint pain [Skin Lesions] : no skin lesions [Dizziness] : no dizziness [Fainting] : no fainting [Anxiety] : no anxiety [de-identified] : controlled depression

## 2021-07-21 LAB
M TB IFN-G BLD-IMP: NEGATIVE
MEV IGG FLD QL IA: >300 AU/ML
MEV IGG+IGM SER-IMP: POSITIVE
MEV IGM SER QL: <0.91 ISR
MUV AB SER-ACNC: POSITIVE
MUV IGG SER QL IA: 139 AU/ML
MUV IGM SER QL IA: 1.2 AU
QUANTIFERON TB PLUS MITOGEN MINUS NIL: 8.68 IU/ML
QUANTIFERON TB PLUS NIL: 0.02 IU/ML
QUANTIFERON TB PLUS TB1 MINUS NIL: -0.01 IU/ML
QUANTIFERON TB PLUS TB2 MINUS NIL: -0.01 IU/ML
RUBV IGG FLD-ACNC: 17.8 INDEX
RUBV IGG SER-IMP: POSITIVE
RUBV IGM FLD-ACNC: <20 AU/ML
VZV AB TITR SER: POSITIVE
VZV IGG SER IF-ACNC: 1187 INDEX
VZV IGM SER IF-ACNC: <0.91 INDEX

## 2021-08-16 ENCOUNTER — RX RENEWAL (OUTPATIENT)
Age: 71
End: 2021-08-16

## 2021-08-30 ENCOUNTER — APPOINTMENT (OUTPATIENT)
Dept: GERIATRICS | Facility: CLINIC | Age: 71
End: 2021-08-30
Payer: MEDICARE

## 2021-08-30 PROCEDURE — 90471 IMMUNIZATION ADMIN: CPT

## 2021-08-30 PROCEDURE — 90715 TDAP VACCINE 7 YRS/> IM: CPT | Mod: GY

## 2021-09-14 ENCOUNTER — APPOINTMENT (OUTPATIENT)
Dept: GERIATRICS | Facility: CLINIC | Age: 71
End: 2021-09-14

## 2021-10-06 ENCOUNTER — RX RENEWAL (OUTPATIENT)
Age: 71
End: 2021-10-06

## 2021-11-08 ENCOUNTER — RX RENEWAL (OUTPATIENT)
Age: 71
End: 2021-11-08

## 2022-01-02 ENCOUNTER — RX RENEWAL (OUTPATIENT)
Age: 72
End: 2022-01-02

## 2022-02-22 ENCOUNTER — RX RENEWAL (OUTPATIENT)
Age: 72
End: 2022-02-22

## 2022-03-29 ENCOUNTER — RX RENEWAL (OUTPATIENT)
Age: 72
End: 2022-03-29

## 2022-03-30 ENCOUNTER — RX RENEWAL (OUTPATIENT)
Age: 72
End: 2022-03-30

## 2022-06-01 ENCOUNTER — RX RENEWAL (OUTPATIENT)
Age: 72
End: 2022-06-01

## 2022-06-16 ENCOUNTER — RX RENEWAL (OUTPATIENT)
Age: 72
End: 2022-06-16

## 2022-08-08 ENCOUNTER — RX RENEWAL (OUTPATIENT)
Age: 72
End: 2022-08-08

## 2022-09-12 ENCOUNTER — RX RENEWAL (OUTPATIENT)
Age: 72
End: 2022-09-12

## 2022-09-13 ENCOUNTER — RX RENEWAL (OUTPATIENT)
Age: 72
End: 2022-09-13

## 2022-10-13 ENCOUNTER — NON-APPOINTMENT (OUTPATIENT)
Age: 72
End: 2022-10-13

## 2022-10-13 ENCOUNTER — APPOINTMENT (OUTPATIENT)
Dept: GERIATRICS | Facility: CLINIC | Age: 72
End: 2022-10-13

## 2022-10-13 VITALS
BODY MASS INDEX: 23.63 KG/M2 | DIASTOLIC BLOOD PRESSURE: 80 MMHG | OXYGEN SATURATION: 98 % | TEMPERATURE: 97.9 F | WEIGHT: 160 LBS | HEART RATE: 74 BPM | SYSTOLIC BLOOD PRESSURE: 122 MMHG

## 2022-10-13 PROCEDURE — G0439: CPT

## 2022-10-13 PROCEDURE — 93000 ELECTROCARDIOGRAM COMPLETE: CPT

## 2022-10-13 NOTE — HISTORY OF PRESENT ILLNESS
[Fully Independent] : fully independent [Drives without concerns] : drives without concerns [No history of falls] : no history of falls [Seatbelts] : seatbelts [Motorcycle Helmet] : motorcycle helmet [Safe Driving Habits] : safe driving habits [Smoke Detectors] : smoke detectors [Carbon Monoxide Detector] : carbon monoxide detector [PMH Reviewed and Updated] : past medical history reviewed and updated [PSH Reviewed and Updated] : past surgical history reviewed and updated [Family History Reviewed and Updated] : family history reviewed and updated [Medication and Allergies Reconciled] : medication and allergies reconciled [Spouse] : spouse [Retired] : retired from work [None] : The patient has no concerns about alcohol abuse [Never] : has never used illicit drugs [Compliant with medications] : compliant with medications [Adequate] : adequate [No falls in past year] : Patient reported no falls in the past year [Completely Independent] : Completely independent. [0] : 2) Feeling down, depressed, or hopeless: Not at all (0) [PHQ-2 Negative - No further assessment needed] : PHQ-2 Negative - No further assessment needed [Over the Past 2 Weeks, Have You Felt Down, Depressed, or Hopeless?] : 1.) Over the past 2 weeks, have you felt down, depressed, or hopeless? No [Over the Past 2 Weeks, Have You Felt Little Interest or Pleasure Doing Things?] : 2.) Over the past 2 weeks, have you felt little interest or pleasure doing things? No [Unable To Manage Meds] : ability to manage ~his/her~ medications [Bathroom Grab Bars] : not using bathroom grab bars [FreeTextEntry1] : treated for kelley mountain spotted fever\par was given doxycycline\par volunteering at [employer]\par due for labs\par had covid and flu vaccines\par also had shingrix in the past\par having some chest discomfort almost daily\par both sides of chest wall\par can be several times a day \par often after with meals\par ibuprofen 600mg up to 3x a day for headaches thought to be related to allergies\par \par \par Hx BPH\par s/p TURP - Dr Charles is urologist - no longer seeing him as he feels fine\par \par \par  [[id number]] : 0

## 2022-10-13 NOTE — REVIEW OF SYSTEMS
[Feeling Poorly] : feeling poorly [Feeling Tired] : feeling tired [Dysuria] : dysuria [Incontinence] : incontinence [Hesitancy] : urinary hesitancy [Nasal Discharge] : nasal discharge [Chest Pain] : chest pain [Fever] : no fever [Chills] : no chills [Eye Pain] : no eye pain [Red Eyes] : eyes not red [Nosebleeds] : no nosebleeds [Palpitations] : no palpitations [Cough] : no cough [SOB on Exertion] : no shortness of breath during exertion [Constipation] : no constipation [Diarrhea] : no diarrhea [Arthralgias] : no arthralgias [Skin Lesions] : no skin lesions [Dizziness] : no dizziness [Fainting] : no fainting [Anxiety] : no anxiety [de-identified] : controlled depression

## 2022-10-13 NOTE — ASSESSMENT
[FreeTextEntry1] : will check fasting labs including iron studies, lipids and PSA\par refer back to urologist for follow up\par unclear if bladder US was done\par seems like GERD from excessive ibuprophen\par increase PPI to 40mg daily and await labs\par EKG unrevealing\par needs to start taking allergy OTC med daily to minimize nsaid usage\par if no improvement would refer back to GI for EGD\par \par in future consider MOCA for baseline next visit\par some concerns about short term memory \par \par \par

## 2022-10-13 NOTE — PHYSICAL EXAM
[General Appearance - Alert] : alert [General Appearance - In No Acute Distress] : in no acute distress [General Appearance - Well Nourished] : well nourished [General Appearance - Well Developed] : well developed [Sclera] : the sclera and conjunctiva were normal [PERRL With Normal Accommodation] : pupils were equal in size, round, and reactive to light [Extraocular Movements] : extraocular movements were intact [Normal Oral Mucosa] : normal oral mucosa [No Oral Pallor] : no oral pallor [Outer Ear] : the ears and nose were normal in appearance [Hearing Threshold Finger Rub Not [place]] : hearing was normal [Neck Appearance] : the appearance of the neck was normal [Neck Cervical Mass (___cm)] : no neck mass was observed [Respiration, Rhythm And Depth] : normal respiratory rhythm and effort [Exaggerated Use Of Accessory Muscles For Inspiration] : no accessory muscle use [Auscultation Breath Sounds / Voice Sounds] : lungs were clear to auscultation bilaterally [Apical Impulse] : the apical impulse was normal [Heart Sounds] : normal S1 and S2 [Heart Sounds Gallop] : no gallops [Heart Sounds Pericardial Friction Rub] : no pericardial rub [Bowel Sounds] : normal bowel sounds [Abdomen Soft] : soft [Abdomen Tenderness] : non-tender [Penis Abnormality] : the penis was normal [Scrotum] : the scrotum was normal [Testes Swelling] : the testicles were not swollen [Testes Mass (___cm)] : there were no testicular masses [Cervical Lymph Nodes Enlarged Posterior Bilaterally] : posterior cervical [Cervical Lymph Nodes Enlarged Anterior Bilaterally] : anterior cervical [No CVA Tenderness] : no ~M costovertebral angle tenderness [Abnormal Walk] : normal gait [Nail Clubbing] : no clubbing  or cyanosis of the fingernails [Musculoskeletal - Swelling] : no joint swelling seen [Motor Tone] : muscle strength and tone were normal [Skin Turgor] : normal skin turgor [Skin Color & Pigmentation] : normal skin color and pigmentation [] : no rash [Oriented To Time, Place, And Person] : oriented to person, place, and time [Impaired Insight] : insight and judgment were intact

## 2022-10-26 ENCOUNTER — RESULT REVIEW (OUTPATIENT)
Age: 72
End: 2022-10-26

## 2022-11-17 ENCOUNTER — RX RENEWAL (OUTPATIENT)
Age: 72
End: 2022-11-17

## 2022-12-15 ENCOUNTER — NON-APPOINTMENT (OUTPATIENT)
Age: 72
End: 2022-12-15

## 2022-12-20 ENCOUNTER — APPOINTMENT (OUTPATIENT)
Dept: GERIATRICS | Facility: CLINIC | Age: 72
End: 2022-12-20
Payer: MEDICARE

## 2022-12-20 VITALS
WEIGHT: 159 LBS | BODY MASS INDEX: 23.48 KG/M2 | HEART RATE: 79 BPM | TEMPERATURE: 97.2 F | OXYGEN SATURATION: 98 % | SYSTOLIC BLOOD PRESSURE: 124 MMHG | DIASTOLIC BLOOD PRESSURE: 82 MMHG

## 2022-12-20 PROCEDURE — 99212 OFFICE O/P EST SF 10 MIN: CPT

## 2023-01-03 NOTE — HISTORY OF PRESENT ILLNESS
[No falls in past year] : Patient reported no falls in the past year [Completely Independent] : Completely independent. [Smoke Detector] : smoke detector [Carbon Monoxide Detector] : carbon monoxide detector [0] : 2) Feeling down, depressed, or hopeless: Not at all (0) [FreeTextEntry1] : was here to discuss MOLST but arrived late due to parking\par due for repeat lipid testing\par but not fasting today \par open to trialing lower dose of PPI \par  [PHQ-2 Negative - No further assessment needed] : PHQ-2 Negative - No further assessment needed [[id number]] : 0

## 2023-01-03 NOTE — PHYSICAL EXAM
[General Appearance - Alert] : alert [General Appearance - In No Acute Distress] : in no acute distress [General Appearance - Well Nourished] : well nourished [General Appearance - Well Developed] : well developed [Sclera] : the sclera and conjunctiva were normal [Extraocular Movements] : extraocular movements were intact [PERRL With Normal Accommodation] : pupils were equal in size, round, and reactive to light [Normal Oral Mucosa] : normal oral mucosa [No Oral Pallor] : no oral pallor [Outer Ear] : the ears and nose were normal in appearance [Hearing Threshold Finger Rub Not [place]] : hearing was normal [Neck Appearance] : the appearance of the neck was normal [Neck Cervical Mass (___cm)] : no neck mass was observed [Respiration, Rhythm And Depth] : normal respiratory rhythm and effort [Exaggerated Use Of Accessory Muscles For Inspiration] : no accessory muscle use [Auscultation Breath Sounds / Voice Sounds] : lungs were clear to auscultation bilaterally [Apical Impulse] : the apical impulse was normal [Heart Sounds] : normal S1 and S2 [Heart Sounds Gallop] : no gallops [Heart Sounds Pericardial Friction Rub] : no pericardial rub [Bowel Sounds] : normal bowel sounds [Abdomen Soft] : soft [Abdomen Tenderness] : non-tender [Abnormal Walk] : normal gait [Nail Clubbing] : no clubbing  or cyanosis of the fingernails [Musculoskeletal - Swelling] : no joint swelling seen [Motor Tone] : muscle strength and tone were normal [Skin Color & Pigmentation] : normal skin color and pigmentation [Skin Turgor] : normal skin turgor [] : no rash [Oriented To Time, Place, And Person] : oriented to person, place, and time [Impaired Insight] : insight and judgment were intact

## 2023-01-03 NOTE — ASSESSMENT
[FreeTextEntry1] : will complete lipids at later date when fasting\par trial lower dose PPI\par next visit MOCA for baseline\par \par

## 2023-01-03 NOTE — REVIEW OF SYSTEMS
[Fever] : no fever [Chills] : no chills [Feeling Poorly] : feeling poorly [Feeling Tired] : feeling tired [Eye Pain] : no eye pain [Red Eyes] : eyes not red [Nosebleeds] : no nosebleeds [Nasal Discharge] : no nasal discharge [Chest Pain] : no chest pain [Palpitations] : no palpitations [Cough] : no cough [SOB on Exertion] : no shortness of breath during exertion [Constipation] : no constipation [Diarrhea] : no diarrhea [Dysuria] : dysuria [Incontinence] : incontinence [Hesitancy] : urinary hesitancy [Arthralgias] : no arthralgias [Skin Lesions] : no skin lesions [Dizziness] : no dizziness [Fainting] : no fainting [Anxiety] : no anxiety [de-identified] : controlled depression

## 2023-01-05 ENCOUNTER — RX RENEWAL (OUTPATIENT)
Age: 73
End: 2023-01-05

## 2023-02-02 ENCOUNTER — RX RENEWAL (OUTPATIENT)
Age: 73
End: 2023-02-02

## 2023-03-20 ENCOUNTER — APPOINTMENT (OUTPATIENT)
Dept: GERIATRICS | Facility: CLINIC | Age: 73
End: 2023-03-20
Payer: MEDICARE

## 2023-03-20 VITALS
HEART RATE: 59 BPM | WEIGHT: 157 LBS | DIASTOLIC BLOOD PRESSURE: 76 MMHG | OXYGEN SATURATION: 98 % | HEIGHT: 69 IN | BODY MASS INDEX: 23.25 KG/M2 | SYSTOLIC BLOOD PRESSURE: 144 MMHG

## 2023-03-20 DIAGNOSIS — Z71.89 OTHER SPECIFIED COUNSELING: ICD-10-CM

## 2023-03-20 DIAGNOSIS — K21.9 GASTRO-ESOPHAGEAL REFLUX DISEASE W/OUT ESOPHAGITIS: ICD-10-CM

## 2023-03-20 PROCEDURE — 99497 ADVNCD CARE PLAN 30 MIN: CPT

## 2023-03-20 PROCEDURE — 99215 OFFICE O/P EST HI 40 MIN: CPT

## 2023-03-20 NOTE — HISTORY OF PRESENT ILLNESS
[FreeTextEntry1] : Here to discuss MOLST\par does have HCP but has copy at home\par wife would be HCP followed by his brother\par \par  [No falls in past year] : Patient reported no falls in the past year [Completely Independent] : Completely independent. [Smoke Detector] : smoke detector [0] : 2) Feeling down, depressed, or hopeless: Not at all (0) [PHQ-2 Negative - No further assessment needed] : PHQ-2 Negative - No further assessment needed [[id number]] : 0 [With Patient/Caregiver] : , with patient/caregiver [Aggressive treatment] : aggressive treatment [I will adhere to the patient's wishes.] : I will adhere to the patient's wishes. [Time Spent: ___ minutes] : Time Spent: [unfilled] minutes [AdvancecareDate] : 03/20/23

## 2023-03-20 NOTE — ASSESSMENT
[FreeTextEntry1] : MOLST completed\par he will bring in HCP next visit\par discussed will also need baseline MOCA\par will return in summer for baseline MOCA and have annual in fall

## 2023-03-20 NOTE — REVIEW OF SYSTEMS
[Fever] : no fever [Chills] : no chills [Feeling Poorly] : feeling poorly [Feeling Tired] : feeling tired [Eye Pain] : no eye pain [Red Eyes] : eyes not red [Nosebleeds] : no nosebleeds [Nasal Discharge] : no nasal discharge [Chest Pain] : no chest pain [Palpitations] : no palpitations [Cough] : no cough [SOB on Exertion] : no shortness of breath during exertion [Constipation] : no constipation [Diarrhea] : no diarrhea [Dysuria] : dysuria [Incontinence] : incontinence [Hesitancy] : urinary hesitancy [Arthralgias] : no arthralgias [Skin Lesions] : no skin lesions [Dizziness] : no dizziness [Fainting] : no fainting [Anxiety] : no anxiety [de-identified] : controlled depression

## 2023-03-20 NOTE — PHYSICAL EXAM
[General Appearance - Alert] : alert [General Appearance - In No Acute Distress] : in no acute distress [General Appearance - Well Nourished] : well nourished [General Appearance - Well Developed] : well developed [Sclera] : the sclera and conjunctiva were normal [PERRL With Normal Accommodation] : pupils were equal in size, round, and reactive to light [Extraocular Movements] : extraocular movements were intact [Normal Oral Mucosa] : normal oral mucosa [No Oral Pallor] : no oral pallor [Outer Ear] : the ears and nose were normal in appearance [Hearing Threshold Finger Rub Not [place]] : hearing was normal [Neck Appearance] : the appearance of the neck was normal [Neck Cervical Mass (___cm)] : no neck mass was observed [Respiration, Rhythm And Depth] : normal respiratory rhythm and effort [Exaggerated Use Of Accessory Muscles For Inspiration] : no accessory muscle use [Auscultation Breath Sounds / Voice Sounds] : lungs were clear to auscultation bilaterally [Apical Impulse] : the apical impulse was normal [Heart Sounds] : normal S1 and S2 [Heart Sounds Gallop] : no gallops [Heart Sounds Pericardial Friction Rub] : no pericardial rub [Bowel Sounds] : normal bowel sounds [Abdomen Soft] : soft [Abdomen Tenderness] : non-tender [Abnormal Walk] : normal gait [Nail Clubbing] : no clubbing  or cyanosis of the fingernails [Musculoskeletal - Swelling] : no joint swelling seen [Motor Tone] : muscle strength and tone were normal [Skin Color & Pigmentation] : normal skin color and pigmentation [Skin Turgor] : normal skin turgor [] : no rash [Oriented To Time, Place, And Person] : oriented to person, place, and time [Impaired Insight] : insight and judgment were intact

## 2023-05-01 ENCOUNTER — RX RENEWAL (OUTPATIENT)
Age: 73
End: 2023-05-01

## 2023-05-03 ENCOUNTER — RX RENEWAL (OUTPATIENT)
Age: 73
End: 2023-05-03

## 2023-05-22 ENCOUNTER — RX RENEWAL (OUTPATIENT)
Age: 73
End: 2023-05-22

## 2023-06-21 ENCOUNTER — RX RENEWAL (OUTPATIENT)
Age: 73
End: 2023-06-21

## 2023-08-11 ENCOUNTER — RX RENEWAL (OUTPATIENT)
Age: 73
End: 2023-08-11

## 2023-08-21 ENCOUNTER — RX RENEWAL (OUTPATIENT)
Age: 73
End: 2023-08-21

## 2023-09-05 ENCOUNTER — RX RENEWAL (OUTPATIENT)
Age: 73
End: 2023-09-05

## 2023-09-07 ENCOUNTER — APPOINTMENT (OUTPATIENT)
Dept: GERIATRICS | Facility: CLINIC | Age: 73
End: 2023-09-07
Payer: MEDICARE

## 2023-09-07 VITALS
OXYGEN SATURATION: 97 % | DIASTOLIC BLOOD PRESSURE: 64 MMHG | TEMPERATURE: 97.9 F | WEIGHT: 157 LBS | BODY MASS INDEX: 23.25 KG/M2 | HEIGHT: 69 IN | HEART RATE: 70 BPM | SYSTOLIC BLOOD PRESSURE: 122 MMHG

## 2023-09-07 PROCEDURE — 99215 OFFICE O/P EST HI 40 MIN: CPT

## 2023-09-07 RX ORDER — FLUOXETINE HYDROCHLORIDE 10 MG/1
10 TABLET ORAL AT BEDTIME
Qty: 45 | Refills: 0 | Status: COMPLETED | COMMUNITY
Start: 2021-05-25 | End: 2023-09-07

## 2023-09-08 NOTE — PHYSICAL EXAM
[General Appearance - Alert] : alert [General Appearance - In No Acute Distress] : in no acute distress [General Appearance - Well Nourished] : well nourished [General Appearance - Well Developed] : well developed [Sclera] : the sclera and conjunctiva were normal [PERRL With Normal Accommodation] : pupils were equal in size, round, and reactive to light [Extraocular Movements] : extraocular movements were intact [Normal Oral Mucosa] : normal oral mucosa [No Oral Pallor] : no oral pallor [Outer Ear] : the ears and nose were normal in appearance [Hearing Threshold Finger Rub Not Dick[place]] : hearing was normal [Neck Appearance] : the appearance of the neck was normal [Neck Cervical Mass (___cm)] : no neck mass was observed [Respiration, Rhythm And Depth] : normal respiratory rhythm and effort [Exaggerated Use Of Accessory Muscles For Inspiration] : no accessory muscle use [Auscultation Breath Sounds / Voice Sounds] : lungs were clear to auscultation bilaterally [Apical Impulse] : the apical impulse was normal [Heart Sounds] : normal S1 and S2 [Heart Sounds Gallop] : no gallops [Heart Sounds Pericardial Friction Rub] : no pericardial rub [Bowel Sounds] : normal bowel sounds [Abdomen Soft] : soft [Abdomen Tenderness] : non-tender [Abnormal Walk] : normal gait [Nail Clubbing] : no clubbing  or cyanosis of the fingernails [Musculoskeletal - Swelling] : no joint swelling seen [Motor Tone] : muscle strength and tone were normal [Skin Color & Pigmentation] : normal skin color and pigmentation [Skin Turgor] : normal skin turgor [] : no rash [Oriented To Time, Place, And Person] : oriented to person, place, and time [Impaired Insight] : insight and judgment were intact

## 2023-09-08 NOTE — ASSESSMENT
[FreeTextEntry1] : MOCA normal 29/30 done for baseline more signs of depression but on higher fluoxetine next visit annual will assure BMP and serum sodium normal on higher fluoxetine

## 2023-09-08 NOTE — REVIEW OF SYSTEMS
[Fever] : no fever [Chills] : no chills [Feeling Poorly] : feeling poorly [Feeling Tired] : feeling tired [Eye Pain] : no eye pain [Red Eyes] : eyes not red [Nosebleeds] : no nosebleeds [Nasal Discharge] : no nasal discharge [Chest Pain] : no chest pain [Palpitations] : no palpitations [Cough] : no cough [SOB on Exertion] : no shortness of breath during exertion [Constipation] : no constipation [Diarrhea] : no diarrhea [Dysuria] : dysuria [Incontinence] : incontinence [Hesitancy] : urinary hesitancy [Arthralgias] : no arthralgias [Skin Lesions] : no skin lesions [Dizziness] : no dizziness [Fainting] : no fainting [Anxiety] : no anxiety [de-identified] : controlled depression

## 2023-09-08 NOTE — HISTORY OF PRESENT ILLNESS
[FreeTextEntry1] : Here to complete MOCA testing having more allergy symptoms had been feeling symptoms of SAD now on higher fluoxetine forgot to bring in HCP - wife and brother    [No falls in past year] : Patient reported no falls in the past year [Completely Independent] : Completely independent. [Smoke Detector] : smoke detector [0] : 2) Feeling down, depressed, or hopeless: Not at all (0) [PHQ-2 Negative - No further assessment needed] : PHQ-2 Negative - No further assessment needed [[id number]] : 0

## 2023-11-13 ENCOUNTER — RX RENEWAL (OUTPATIENT)
Age: 73
End: 2023-11-13

## 2023-11-21 ENCOUNTER — APPOINTMENT (OUTPATIENT)
Dept: GERIATRICS | Facility: CLINIC | Age: 73
End: 2023-11-21

## 2024-01-03 ENCOUNTER — RX RENEWAL (OUTPATIENT)
Age: 74
End: 2024-01-03

## 2024-01-03 RX ORDER — PANTOPRAZOLE 20 MG/1
20 TABLET, DELAYED RELEASE ORAL
Qty: 90 | Refills: 3 | Status: ACTIVE | COMMUNITY
Start: 2020-08-06 | End: 1900-01-01

## 2024-01-22 ENCOUNTER — RX RENEWAL (OUTPATIENT)
Age: 74
End: 2024-01-22

## 2024-01-22 RX ORDER — HYDROXYZINE PAMOATE 25 MG/1
25 CAPSULE ORAL DAILY
Qty: 30 | Refills: 5 | Status: ACTIVE | COMMUNITY
Start: 2021-06-08 | End: 1900-01-01

## 2024-02-26 ENCOUNTER — RX RENEWAL (OUTPATIENT)
Age: 74
End: 2024-02-26

## 2024-03-25 ENCOUNTER — RX RENEWAL (OUTPATIENT)
Age: 74
End: 2024-03-25

## 2024-03-25 RX ORDER — MONTELUKAST 10 MG/1
10 TABLET, FILM COATED ORAL
Qty: 90 | Refills: 3 | Status: ACTIVE | COMMUNITY
Start: 2021-04-13 | End: 1900-01-01

## 2024-04-01 ENCOUNTER — RX RENEWAL (OUTPATIENT)
Age: 74
End: 2024-04-01

## 2024-04-29 ENCOUNTER — APPOINTMENT (OUTPATIENT)
Dept: GERIATRICS | Facility: CLINIC | Age: 74
End: 2024-04-29
Payer: MEDICARE

## 2024-04-29 VITALS — RESPIRATION RATE: 16 BRPM

## 2024-04-29 DIAGNOSIS — Z87.898 PERSONAL HISTORY OF OTHER SPECIFIED CONDITIONS: ICD-10-CM

## 2024-04-29 DIAGNOSIS — R10.30 LOWER ABDOMINAL PAIN, UNSPECIFIED: ICD-10-CM

## 2024-04-29 DIAGNOSIS — N32.0 BLADDER-NECK OBSTRUCTION: ICD-10-CM

## 2024-04-29 DIAGNOSIS — Z86.69 PERSONAL HISTORY OF OTHER DISEASES OF THE NERVOUS SYSTEM AND SENSE ORGANS: ICD-10-CM

## 2024-04-29 DIAGNOSIS — G47.10 HYPERSOMNIA, UNSPECIFIED: ICD-10-CM

## 2024-04-29 DIAGNOSIS — J34.2 DEVIATED NASAL SEPTUM: ICD-10-CM

## 2024-04-29 DIAGNOSIS — E53.8 DEFICIENCY OF OTHER SPECIFIED B GROUP VITAMINS: ICD-10-CM

## 2024-04-29 DIAGNOSIS — Z12.5 ENCOUNTER FOR SCREENING FOR MALIGNANT NEOPLASM OF PROSTATE: ICD-10-CM

## 2024-04-29 DIAGNOSIS — Z87.09 PERSONAL HISTORY OF OTHER DISEASES OF THE RESPIRATORY SYSTEM: ICD-10-CM

## 2024-04-29 DIAGNOSIS — Z87.438 PERSONAL HISTORY OF OTHER DISEASES OF MALE GENITAL ORGANS: ICD-10-CM

## 2024-04-29 PROCEDURE — G0439: CPT | Mod: 2W

## 2024-04-29 RX ORDER — ACETAMINOPHEN/DIPHENHYDRAMINE 500MG-25MG
1000 TABLET ORAL
Refills: 0 | Status: ACTIVE | COMMUNITY
Start: 2024-04-29

## 2024-04-29 RX ORDER — AZELASTINE HYDROCHLORIDE AND FLUTICASONE PROPIONATE 137; 50 UG/1; UG/1
SPRAY, METERED NASAL
Refills: 0 | Status: COMPLETED | COMMUNITY
End: 2024-04-29

## 2024-04-29 RX ORDER — FLUOXETINE HYDROCHLORIDE 10 MG/1
10 CAPSULE ORAL
Refills: 0 | Status: COMPLETED | COMMUNITY
End: 2024-04-29

## 2024-04-29 RX ORDER — FLUTICASONE PROPIONATE 220 UG/1
AEROSOL, METERED RESPIRATORY (INHALATION) TWICE DAILY
Refills: 0 | Status: COMPLETED | COMMUNITY
End: 2024-04-29

## 2024-04-29 RX ORDER — FLUTICASONE FUROATE AND VILANTEROL TRIFENATATE 100; 25 UG/1; UG/1
100-25 POWDER RESPIRATORY (INHALATION)
Qty: 180 | Refills: 3 | Status: COMPLETED | COMMUNITY
Start: 2022-11-17 | End: 2024-04-29

## 2024-04-29 RX ORDER — FLUTICASONE FUROATE AND VILANTEROL TRIFENATATE 200; 25 UG/1; UG/1
200-25 POWDER RESPIRATORY (INHALATION)
Refills: 0 | Status: ACTIVE | COMMUNITY
Start: 2024-04-29

## 2024-04-29 RX ORDER — CEFUROXIME AXETIL 500 MG/1
500 TABLET ORAL
Qty: 28 | Refills: 0 | Status: COMPLETED | COMMUNITY
Start: 2021-01-26 | End: 2024-04-29

## 2024-04-29 RX ORDER — FLUTICASONE FUROATE AND VILANTEROL TRIFENATATE 100; 25 UG/1; UG/1
100-25 POWDER RESPIRATORY (INHALATION)
Qty: 180 | Refills: 1 | Status: COMPLETED | COMMUNITY
Start: 2021-05-27 | End: 2024-04-29

## 2024-04-29 RX ORDER — MULTIVIT-MIN/FOLIC/VIT K/LYCOP 400-300MCG
50 MCG TABLET ORAL
Refills: 0 | Status: ACTIVE | COMMUNITY
Start: 2024-04-29

## 2024-04-29 NOTE — HISTORY OF PRESENT ILLNESS
[PMH Reviewed and Updated] : past medical history reviewed and updated [PSH Reviewed and Updated] : past surgical history reviewed and updated [Family History Reviewed and Updated] : family history reviewed and updated [Medication and Allergies Reconciled] : medication and allergies reconciled [0] : 0 [Retired] : retired from work [None] : The patient has no concerns about alcohol abuse [Walking] : walking [Compliant with medications] : compliant with medications [Spouse] : spouse [Friends] : friends [Fully Independent] : fully independent [Drives without concerns] : drives without concerns [No history of falls] : no history of falls [Seatbelts] : seatbelts [Safe Driving Habits] : safe driving habits [Smoke Detectors] : smoke detectors [Carbon Monoxide Detector] : carbon monoxide detector [Hot Water Temp <120F] : hot water temp <120F [Sunscreen] : sunscreen [Over the Past 2 Weeks, Have You Felt Down, Depressed, or Hopeless?] : 1.) Over the past 2 weeks, have you felt down, depressed, or hopeless? No [Over the Past 2 Weeks, Have You Felt Little Interest or Pleasure Doing Things?] : 2.) Over the past 2 weeks, have you felt little interest or pleasure doing things? No [Bathroom Grab Bars] : not using bathroom grab bars [CPR Training for Household] : did not receive CPR training for patient [CPR Training for Patient] : did not receive CPR training for patient [FreeTextEntry1] :  [de-identified] : recovering alcoholic x 9 years [FreeTextEntry9] : daily aerobic and weights [FreeTextEntry3] : brother and wife in [place] [FreeTextEntry5] : doesn't drive at night in rain [de-identified] : MOLST needs to be signed; pt will bring in. [TextBox_31] : left ear hearing aid, follows with [facility] [TextBox_37] : sees opthalmologist once a year [TextBox_43] : has twice a year cleanings [TextBox_19] : colonoscopy done 2021 - f/u in 5 years 2026 [FreeTextEntry2] : does screenings every year - sees a derm at [facility]

## 2024-04-29 NOTE — PHYSICAL EXAM
[Alert] : alert [Well Nourished] : well nourished [Healthy Appearing] : healthy appearing [No Acute Distress] : in no acute distress [Normal Voice/Communication] : normal voice/communication [No Respiratory Distress] : no respiratory distress [Respiration, Rhythm And Depth] : normal respiratory rhythm and effort [Normal] : normal skin color and pigmentation [Oriented To Time, Place, And Person] : oriented to person, place, and time [Normal Affect] : the affect was normal [Normal Insight/Judgment] : insight and judgment were intact [Normal Mood] : the mood was normal [MentalStatusTotal] : AD8 screening done = 0 (neg)

## 2024-04-29 NOTE — ASSESSMENT
[FreeTextEntry1] : Aliyah Chiu psychiatrist dermatology at [facility] - [facility] - [facility] for hearing aid

## 2024-05-22 ENCOUNTER — RESULT REVIEW (OUTPATIENT)
Age: 74
End: 2024-05-22

## 2024-05-23 ENCOUNTER — APPOINTMENT (OUTPATIENT)
Dept: GERIATRICS | Facility: CLINIC | Age: 74
End: 2024-05-23
Payer: MEDICARE

## 2024-05-23 VITALS
WEIGHT: 156 LBS | SYSTOLIC BLOOD PRESSURE: 140 MMHG | HEIGHT: 69 IN | TEMPERATURE: 97.8 F | DIASTOLIC BLOOD PRESSURE: 78 MMHG | OXYGEN SATURATION: 98 % | HEART RATE: 76 BPM | BODY MASS INDEX: 23.11 KG/M2

## 2024-05-23 DIAGNOSIS — D50.9 IRON DEFICIENCY ANEMIA, UNSPECIFIED: ICD-10-CM

## 2024-05-23 DIAGNOSIS — E78.5 HYPERLIPIDEMIA, UNSPECIFIED: ICD-10-CM

## 2024-05-23 DIAGNOSIS — J45.909 UNSPECIFIED ASTHMA, UNCOMPLICATED: ICD-10-CM

## 2024-05-23 DIAGNOSIS — E55.9 VITAMIN D DEFICIENCY, UNSPECIFIED: ICD-10-CM

## 2024-05-23 DIAGNOSIS — F32.A DEPRESSION, UNSPECIFIED: ICD-10-CM

## 2024-05-23 PROCEDURE — 99214 OFFICE O/P EST MOD 30 MIN: CPT

## 2024-05-23 PROCEDURE — G2211 COMPLEX E/M VISIT ADD ON: CPT

## 2024-05-23 RX ORDER — FLUOXETINE HYDROCHLORIDE 10 MG/1
10 CAPSULE ORAL DAILY
Refills: 0 | Status: ACTIVE | COMMUNITY
Start: 2023-09-07

## 2024-05-23 NOTE — PHYSICAL EXAM
[No Acute Distress] : no acute distress [Well Nourished] : well nourished [Well Developed] : well developed [Well-Appearing] : well-appearing [Normal Sclera/Conjunctiva] : normal sclera/conjunctiva [PERRL] : pupils equal round and reactive to light [Normal Outer Ear/Nose] : the outer ears and nose were normal in appearance [Normal Oropharynx] : the oropharynx was normal [No Respiratory Distress] : no respiratory distress  [No Accessory Muscle Use] : no accessory muscle use [Clear to Auscultation] : lungs were clear to auscultation bilaterally [Normal Rate] : normal rate  [Regular Rhythm] : with a regular rhythm [Normal S1, S2] : normal S1 and S2 [Soft] : abdomen soft [Non Tender] : non-tender [No HSM] : no HSM [Normal Bowel Sounds] : normal bowel sounds [Normal Supraclavicular Nodes] : no supraclavicular lymphadenopathy [Normal Axillary Nodes] : no axillary lymphadenopathy [Normal Anterior Cervical Nodes] : no anterior cervical lymphadenopathy [No CVA Tenderness] : no CVA  tenderness [No Spinal Tenderness] : no spinal tenderness [No Joint Swelling] : no joint swelling [Grossly Normal Strength/Tone] : grossly normal strength/tone

## 2024-05-29 ENCOUNTER — APPOINTMENT (OUTPATIENT)
Dept: GERIATRICS | Facility: CLINIC | Age: 74
End: 2024-05-29

## 2024-06-18 ENCOUNTER — RX RENEWAL (OUTPATIENT)
Age: 74
End: 2024-06-18

## 2024-06-18 RX ORDER — ATORVASTATIN CALCIUM 20 MG/1
20 TABLET, FILM COATED ORAL DAILY
Qty: 90 | Refills: 3 | Status: ACTIVE | COMMUNITY
Start: 2021-11-08 | End: 1900-01-01

## 2024-06-18 RX ORDER — DICYCLOMINE HYDROCHLORIDE 20 MG/1
20 TABLET ORAL
Qty: 240 | Refills: 0 | Status: ACTIVE | COMMUNITY
Start: 2018-12-03 | End: 1900-01-01

## 2024-06-27 NOTE — HISTORY OF PRESENT ILLNESS
[No Pertinent Cardiac History] : no history of aortic stenosis, atrial fibrillation, coronary artery disease, recent myocardial infarction, or implantable device/pacemaker [Asthma] : asthma [No Adverse Anesthesia Reaction] : no adverse anesthesia reaction in self or family member [(Patient denies any chest pain, claudication, dyspnea on exertion, orthopnea, palpitations or syncope)] : Patient denies any chest pain, claudication, dyspnea on exertion, orthopnea, palpitations or syncope [Good (7-10 METs)] : Good (7-10 METs) [NSAIDs: _____] : NSAIDs: [unfilled] [Aortic Stenosis] : no aortic stenosis [Atrial Fibrillation] : no atrial fibrillation [Coronary Artery Disease] : no coronary artery disease [Recent Myocardial Infarction] : no recent myocardial infarction [Implantable Device/Pacemaker] : no implantable device/pacemaker [COPD] : no COPD [Sleep Apnea] : no sleep apnea [Smoker] : not a smoker [Family Member] : no family member with adverse anesthesia reaction/sudden death [Self] : no previous adverse anesthesia reaction [Chronic Anticoagulation] : no chronic anticoagulation [Chronic Kidney Disease] : no chronic kidney disease [Diabetes] : no diabetes [FreeTextEntry1] : Resection of upper lip with an island pedicle flap and possible neck dissection [FreeTextEntry2] : 06/03/2024 [FreeTextEntry3] : Dr Rodríguez [FreeTextEntry4] : Presenting for preop risk stratification for lesion on upper lip. He feels well and denies any chest pain, shortness of breath or burning on urination. Actively volunteering at [facility] Was able to arrange preoperative EKG and labs with surgeon's orders.

## 2024-06-27 NOTE — ASSESSMENT
[Patient Optimized for Surgery] : Patient optimized for surgery [ECG] : ECG [As per surgery] : as per surgery [FreeTextEntry4] : RCRI 0  therefore 3.9 % 30-day risk of death, MI, or cardiac arrest Reviewed EKG Normal sinus rhythm @ 65 BPM no acute ST or T wave changes labs reviewed no contraindications to OR as benefits outweigh risks for this procedure, he may proceed advised to avoid Advil or any blood thinners for 7 days prior to OR

## 2024-06-27 NOTE — REVIEW OF SYSTEMS
[Headache] : headache [Fever] : no fever [Chills] : no chills [Discharge] : no discharge [Pain] : no pain [Earache] : no earache [Hearing Loss] : no hearing loss [Chest Pain] : no chest pain [Palpitations] : no palpitations [Shortness Of Breath] : no shortness of breath [Wheezing] : no wheezing [Abdominal Pain] : no abdominal pain [Vomiting] : no vomiting [Incontinence] : no incontinence [Frequency] : no frequency [Itching] : no itching [Skin Rash] : no skin rash [Dizziness] : no dizziness [Unsteady Walk] : no ataxia [de-identified] : controlled depression

## 2024-08-15 ENCOUNTER — RX RENEWAL (OUTPATIENT)
Age: 74
End: 2024-08-15

## 2024-10-07 ENCOUNTER — RX RENEWAL (OUTPATIENT)
Age: 74
End: 2024-10-07

## 2024-11-29 ENCOUNTER — RX RENEWAL (OUTPATIENT)
Age: 74
End: 2024-11-29

## 2024-12-02 ENCOUNTER — RX RENEWAL (OUTPATIENT)
Age: 74
End: 2024-12-02

## 2025-01-10 ENCOUNTER — RESULT REVIEW (OUTPATIENT)
Age: 75
End: 2025-01-10

## 2025-01-13 ENCOUNTER — APPOINTMENT (OUTPATIENT)
Dept: GERIATRICS | Facility: CLINIC | Age: 75
End: 2025-01-13
Payer: MEDICARE

## 2025-01-13 VITALS
OXYGEN SATURATION: 97 % | DIASTOLIC BLOOD PRESSURE: 82 MMHG | HEIGHT: 69 IN | WEIGHT: 154 LBS | BODY MASS INDEX: 22.81 KG/M2 | TEMPERATURE: 97.8 F | SYSTOLIC BLOOD PRESSURE: 140 MMHG | HEART RATE: 77 BPM

## 2025-01-13 DIAGNOSIS — E78.5 HYPERLIPIDEMIA, UNSPECIFIED: ICD-10-CM

## 2025-01-13 DIAGNOSIS — K58.2 MIXED IRRITABLE BOWEL SYNDROME: ICD-10-CM

## 2025-01-13 DIAGNOSIS — R14.0 ABDOMINAL DISTENSION (GASEOUS): ICD-10-CM

## 2025-01-13 DIAGNOSIS — K58.8 OTHER IRRITABLE BOWEL SYNDROME: ICD-10-CM

## 2025-01-13 DIAGNOSIS — R91.8 OTHER NONSPECIFIC ABNORMAL FINDING OF LUNG FIELD: ICD-10-CM

## 2025-01-13 DIAGNOSIS — K58.9 IRRITABLE BOWEL SYNDROME, UNSPECIFIED: ICD-10-CM

## 2025-01-13 DIAGNOSIS — R94.02 ABNORMAL BRAIN SCAN: ICD-10-CM

## 2025-01-13 DIAGNOSIS — F32.A DEPRESSION, UNSPECIFIED: ICD-10-CM

## 2025-01-13 PROCEDURE — G2211 COMPLEX E/M VISIT ADD ON: CPT

## 2025-01-13 PROCEDURE — 99214 OFFICE O/P EST MOD 30 MIN: CPT

## 2025-01-17 RX ORDER — BUDESONIDE 0.5 MG/2ML
0.5 INHALANT ORAL
Qty: 360 | Refills: 0 | Status: ACTIVE | COMMUNITY
Start: 2024-05-17

## 2025-01-17 RX ORDER — AZELASTINE HYDROCHLORIDE 137 UG/1
137 SPRAY, METERED NASAL
Qty: 30 | Refills: 0 | Status: ACTIVE | COMMUNITY
Start: 2024-11-11

## 2025-01-17 RX ORDER — FAMOTIDINE 40 MG/1
40 TABLET, FILM COATED ORAL
Qty: 90 | Refills: 0 | Status: ACTIVE | COMMUNITY
Start: 2024-11-11

## 2025-01-18 ENCOUNTER — RESULT REVIEW (OUTPATIENT)
Age: 75
End: 2025-01-18

## 2025-01-21 ENCOUNTER — RX RENEWAL (OUTPATIENT)
Age: 75
End: 2025-01-21

## 2025-03-04 ENCOUNTER — RX RENEWAL (OUTPATIENT)
Age: 75
End: 2025-03-04

## 2025-04-24 ENCOUNTER — RX RENEWAL (OUTPATIENT)
Age: 75
End: 2025-04-24

## 2025-04-28 ENCOUNTER — RX RENEWAL (OUTPATIENT)
Age: 75
End: 2025-04-28

## 2025-05-17 ENCOUNTER — NON-APPOINTMENT (OUTPATIENT)
Age: 75
End: 2025-05-17

## 2025-05-19 ENCOUNTER — APPOINTMENT (OUTPATIENT)
Dept: GERIATRICS | Facility: CLINIC | Age: 75
End: 2025-05-19
Payer: MEDICARE

## 2025-05-19 VITALS
SYSTOLIC BLOOD PRESSURE: 139 MMHG | DIASTOLIC BLOOD PRESSURE: 84 MMHG | BODY MASS INDEX: 22.96 KG/M2 | OXYGEN SATURATION: 97 % | WEIGHT: 155 LBS | TEMPERATURE: 96.3 F | HEART RATE: 74 BPM | HEIGHT: 69 IN

## 2025-05-19 DIAGNOSIS — R68.2 DRY MOUTH, UNSPECIFIED: ICD-10-CM

## 2025-05-19 DIAGNOSIS — K58.9 IRRITABLE BOWEL SYNDROME, UNSPECIFIED: ICD-10-CM

## 2025-05-19 DIAGNOSIS — E78.5 HYPERLIPIDEMIA, UNSPECIFIED: ICD-10-CM

## 2025-05-19 DIAGNOSIS — J45.909 UNSPECIFIED ASTHMA, UNCOMPLICATED: ICD-10-CM

## 2025-05-19 DIAGNOSIS — F32.A DEPRESSION, UNSPECIFIED: ICD-10-CM

## 2025-05-19 PROCEDURE — 99214 OFFICE O/P EST MOD 30 MIN: CPT

## 2025-05-19 PROCEDURE — G2211 COMPLEX E/M VISIT ADD ON: CPT

## 2025-08-07 ENCOUNTER — APPOINTMENT (OUTPATIENT)
Dept: GERIATRICS | Facility: CLINIC | Age: 75
End: 2025-08-07
Payer: MEDICARE

## 2025-08-07 DIAGNOSIS — U07.1 COVID-19: ICD-10-CM

## 2025-08-07 DIAGNOSIS — R05.1 ACUTE COUGH: ICD-10-CM

## 2025-08-07 PROCEDURE — 99213 OFFICE O/P EST LOW 20 MIN: CPT | Mod: 2W

## 2025-08-07 RX ORDER — GUAIFENESIN 600 MG/1
600 TABLET, EXTENDED RELEASE ORAL
Qty: 1 | Refills: 1 | Status: COMPLETED | COMMUNITY
Start: 2025-08-07 | End: 2025-08-13

## 2025-08-08 PROBLEM — U07.1 CLINICAL DIAGNOSIS OF COVID-19: Status: ACTIVE | Noted: 2025-08-07
